# Patient Record
Sex: MALE | Race: WHITE | NOT HISPANIC OR LATINO | Employment: FULL TIME | URBAN - METROPOLITAN AREA
[De-identification: names, ages, dates, MRNs, and addresses within clinical notes are randomized per-mention and may not be internally consistent; named-entity substitution may affect disease eponyms.]

---

## 2020-04-21 DIAGNOSIS — Z01.84 ANTIBODY RESPONSE EXAMINATION: ICD-10-CM

## 2020-04-23 ENCOUNTER — LAB VISIT (OUTPATIENT)
Dept: LAB | Facility: HOSPITAL | Age: 30
End: 2020-04-23
Attending: INTERNAL MEDICINE
Payer: MEDICAID

## 2020-04-23 DIAGNOSIS — Z01.84 ANTIBODY RESPONSE EXAMINATION: ICD-10-CM

## 2020-04-23 LAB — SARS-COV-2 IGG SERPL QL IA: NEGATIVE

## 2020-04-23 PROCEDURE — 86769 SARS-COV-2 COVID-19 ANTIBODY: CPT

## 2020-04-23 PROCEDURE — 36415 COLL VENOUS BLD VENIPUNCTURE: CPT

## 2022-09-29 ENCOUNTER — OFFICE VISIT (OUTPATIENT)
Dept: PRIMARY CARE CLINIC | Facility: CLINIC | Age: 32
End: 2022-09-29
Payer: MEDICAID

## 2022-09-29 VITALS
HEIGHT: 72 IN | RESPIRATION RATE: 18 BRPM | TEMPERATURE: 98 F | SYSTOLIC BLOOD PRESSURE: 120 MMHG | HEART RATE: 64 BPM | WEIGHT: 215.75 LBS | OXYGEN SATURATION: 97 % | DIASTOLIC BLOOD PRESSURE: 64 MMHG | BODY MASS INDEX: 29.22 KG/M2

## 2022-09-29 DIAGNOSIS — M25.561 CHRONIC PAIN OF RIGHT KNEE: ICD-10-CM

## 2022-09-29 DIAGNOSIS — Z11.59 NEED FOR HEPATITIS C SCREENING TEST: ICD-10-CM

## 2022-09-29 DIAGNOSIS — J45.990 EXERCISE-INDUCED ASTHMA: Primary | ICD-10-CM

## 2022-09-29 DIAGNOSIS — R10.11 RUQ PAIN: ICD-10-CM

## 2022-09-29 DIAGNOSIS — Z11.4 ENCOUNTER FOR SCREENING FOR HUMAN IMMUNODEFICIENCY VIRUS (HIV): ICD-10-CM

## 2022-09-29 DIAGNOSIS — Z00.00 WELL ADULT EXAM: ICD-10-CM

## 2022-09-29 DIAGNOSIS — N62 PSEUDOGYNECOMASTIA: ICD-10-CM

## 2022-09-29 DIAGNOSIS — G89.29 CHRONIC PAIN OF RIGHT KNEE: ICD-10-CM

## 2022-09-29 DIAGNOSIS — R07.89 SENSATION OF CHEST TIGHTNESS: ICD-10-CM

## 2022-09-29 DIAGNOSIS — B36.0 TINEA VERSICOLOR: ICD-10-CM

## 2022-09-29 DIAGNOSIS — K21.9 GASTROESOPHAGEAL REFLUX DISEASE, UNSPECIFIED WHETHER ESOPHAGITIS PRESENT: ICD-10-CM

## 2022-09-29 PROCEDURE — 3074F PR MOST RECENT SYSTOLIC BLOOD PRESSURE < 130 MM HG: ICD-10-PCS | Mod: CPTII,,, | Performed by: FAMILY MEDICINE

## 2022-09-29 PROCEDURE — 99999 PR PBB SHADOW E&M-EST. PATIENT-LVL V: ICD-10-PCS | Mod: PBBFAC,,, | Performed by: FAMILY MEDICINE

## 2022-09-29 PROCEDURE — 3008F BODY MASS INDEX DOCD: CPT | Mod: CPTII,,, | Performed by: FAMILY MEDICINE

## 2022-09-29 PROCEDURE — 3008F PR BODY MASS INDEX (BMI) DOCUMENTED: ICD-10-PCS | Mod: CPTII,,, | Performed by: FAMILY MEDICINE

## 2022-09-29 PROCEDURE — 3078F DIAST BP <80 MM HG: CPT | Mod: CPTII,,, | Performed by: FAMILY MEDICINE

## 2022-09-29 PROCEDURE — 93010 ELECTROCARDIOGRAM REPORT: CPT | Mod: S$PBB,,, | Performed by: INTERNAL MEDICINE

## 2022-09-29 PROCEDURE — 93010 EKG 12-LEAD: ICD-10-PCS | Mod: S$PBB,,, | Performed by: INTERNAL MEDICINE

## 2022-09-29 PROCEDURE — 99204 OFFICE O/P NEW MOD 45 MIN: CPT | Mod: S$PBB,,, | Performed by: FAMILY MEDICINE

## 2022-09-29 PROCEDURE — 1159F MED LIST DOCD IN RCRD: CPT | Mod: CPTII,,, | Performed by: FAMILY MEDICINE

## 2022-09-29 PROCEDURE — 3074F SYST BP LT 130 MM HG: CPT | Mod: CPTII,,, | Performed by: FAMILY MEDICINE

## 2022-09-29 PROCEDURE — 3078F PR MOST RECENT DIASTOLIC BLOOD PRESSURE < 80 MM HG: ICD-10-PCS | Mod: CPTII,,, | Performed by: FAMILY MEDICINE

## 2022-09-29 PROCEDURE — 99215 OFFICE O/P EST HI 40 MIN: CPT | Mod: PBBFAC,PN | Performed by: FAMILY MEDICINE

## 2022-09-29 PROCEDURE — 1160F PR REVIEW ALL MEDS BY PRESCRIBER/CLIN PHARMACIST DOCUMENTED: ICD-10-PCS | Mod: CPTII,,, | Performed by: FAMILY MEDICINE

## 2022-09-29 PROCEDURE — 99999 PR PBB SHADOW E&M-EST. PATIENT-LVL V: CPT | Mod: PBBFAC,,, | Performed by: FAMILY MEDICINE

## 2022-09-29 PROCEDURE — 93005 ELECTROCARDIOGRAM TRACING: CPT | Mod: PBBFAC,PN | Performed by: INTERNAL MEDICINE

## 2022-09-29 PROCEDURE — 1159F PR MEDICATION LIST DOCUMENTED IN MEDICAL RECORD: ICD-10-PCS | Mod: CPTII,,, | Performed by: FAMILY MEDICINE

## 2022-09-29 PROCEDURE — 1160F RVW MEDS BY RX/DR IN RCRD: CPT | Mod: CPTII,,, | Performed by: FAMILY MEDICINE

## 2022-09-29 PROCEDURE — 99204 PR OFFICE/OUTPT VISIT, NEW, LEVL IV, 45-59 MIN: ICD-10-PCS | Mod: S$PBB,,, | Performed by: FAMILY MEDICINE

## 2022-09-29 RX ORDER — KETOCONAZOLE 20 MG/ML
SHAMPOO, SUSPENSION TOPICAL
Qty: 120 ML | Refills: 2 | Status: SHIPPED | OUTPATIENT
Start: 2022-09-29

## 2022-09-29 RX ORDER — ALBUTEROL SULFATE 90 UG/1
2 AEROSOL, METERED RESPIRATORY (INHALATION) EVERY 6 HOURS PRN
Qty: 18 G | Refills: 0 | Status: SHIPPED | OUTPATIENT
Start: 2022-09-29 | End: 2023-08-03 | Stop reason: SDUPTHER

## 2022-10-03 NOTE — PROGRESS NOTES
Subjective:       Patient ID: Ze Toscano is a 32 y.o. male.    Chief Complaint: Annual Exam    HPI:  Patient is a 32 year old male presents with multiple complaints.  Patient with exercise induced asthma and requesting a refill on Albuterol inhaler.  He has tightness in the substernal region lasting < 1 second.  Patient also with chronic fungal infection involving his trunk.  He also has concerns about possible ADD.  He reports forgetfulness and having to pace around often.  He reports enlarged breast tissue for years despite exercise> He also reports chronic GERD for years.  He is concerned about sharp RUQ pain over past 2-3 weeks.  Pain occurs for .5 to 1.5 hrs after meals.  Review of Systems   Constitutional:  Negative for appetite change, chills, diaphoresis, fatigue, fever and unexpected weight change.   HENT:  Negative for ear pain, sinus pressure/congestion, sore throat and trouble swallowing.    Eyes:  Negative for visual disturbance.   Respiratory:  Negative for cough, chest tightness, shortness of breath and wheezing.    Cardiovascular:  Positive for chest pain. Negative for palpitations and leg swelling.   Gastrointestinal:  Positive for abdominal pain. Negative for abdominal distention, constipation, diarrhea, nausea and vomiting.   Genitourinary:  Negative for difficulty urinating, discharge, dysuria, frequency, hematuria and urgency.   Musculoskeletal:  Negative for arthralgias, back pain and joint swelling.   Integumentary:  Negative for rash.   Neurological:  Negative for dizziness, light-headedness and headaches.   Hematological:  Negative for adenopathy.   Psychiatric/Behavioral:  Positive for decreased concentration. Negative for dysphoric mood and sleep disturbance. The patient is hyperactive. The patient is not nervous/anxious.        Objective:      Physical Exam  Constitutional:       Appearance: Normal appearance. He is well-developed.   HENT:      Head: Normocephalic and atraumatic.       Right Ear: External ear normal.      Left Ear: External ear normal.      Nose: Nose normal.      Mouth/Throat:      Pharynx: No oropharyngeal exudate.   Eyes:      Conjunctiva/sclera: Conjunctivae normal.      Pupils: Pupils are equal, round, and reactive to light.   Neck:      Thyroid: No thyromegaly.   Cardiovascular:      Rate and Rhythm: Normal rate and regular rhythm.      Heart sounds: Normal heart sounds. No murmur heard.  Pulmonary:      Effort: Pulmonary effort is normal.      Breath sounds: Normal breath sounds. No wheezing or rales.   Chest:      Comments: Enlarged breast tissue, no palpable nodules  Abdominal:      General: Bowel sounds are normal.      Palpations: Abdomen is soft. There is no mass.      Tenderness: There is no abdominal tenderness.   Musculoskeletal:      Cervical back: Neck supple.      Right lower leg: No edema.      Left lower leg: No edema.   Lymphadenopathy:      Cervical: No cervical adenopathy.      Upper Body:      Right upper body: No supraclavicular adenopathy.      Left upper body: No supraclavicular adenopathy.   Skin:     General: Skin is warm and dry.      Findings: Rash (diffuse hyperpigmented rash on trunk) present.   Neurological:      Mental Status: He is alert and oriented to person, place, and time.   Psychiatric:         Mood and Affect: Mood normal.     EKG:  NSR VR 61  Assessment:       Problem List Items Addressed This Visit    None  Visit Diagnoses       Exercise-induced asthma    -  Primary    Relevant Medications    albuterol (PROVENTIL HFA) 90 mcg/actuation inhaler    Sensation of chest tightness        Relevant Orders    IN OFFICE EKG 12-LEAD (to Muse)    Tinea versicolor        Relevant Medications    ketoconazole (NIZORAL) 2 % shampoo    Gastroesophageal reflux disease, unspecified whether esophagitis present        Pseudogynecomastia        Relevant Orders    Testosterone, Free    RUQ pain        Relevant Orders    Ambulatory referral/consult to  Gastroenterology    Well adult exam        Relevant Orders    Lipid Panel    Need for hepatitis C screening test        Relevant Orders    Hepatitis C Antibody    Encounter for screening for human immunodeficiency virus (HIV)        Relevant Orders    HIV 1/2 Ag/Ab (4th Gen)    Chronic pain of right knee        Relevant Orders    Ambulatory referral/consult to Sports Medicine              Plan:     Ze was seen today for annual exam.    Diagnoses and all orders for this visit:    Exercise-induced asthma  -     albuterol (PROVENTIL HFA) 90 mcg/actuation inhaler; Inhale 2 puffs into the lungs every 6 (six) hours as needed for Wheezing. Rescue    Sensation of chest tightness  -     IN OFFICE EKG 12-LEAD (to Muse)    Tinea versicolor  -     ketoconazole (NIZORAL) 2 % shampoo; Apply topically twice a week.    Gastroesophageal reflux disease, unspecified whether esophagitis present  Clinically stable    Pseudogynecomastia  -     Testosterone, Free; Future    RUQ pain  -     Ambulatory referral/consult to Gastroenterology; Future    Well adult exam  -     Lipid Panel; Future    Need for hepatitis C screening test  -     Hepatitis C Antibody; Future    Encounter for screening for human immunodeficiency virus (HIV)  -     HIV 1/2 Ag/Ab (4th Gen); Future    Chronic pain of right knee  -     Ambulatory referral/consult to Sports Medicine; Future

## 2022-11-28 DIAGNOSIS — M25.561 RIGHT KNEE PAIN, UNSPECIFIED CHRONICITY: Primary | ICD-10-CM

## 2022-11-29 ENCOUNTER — HOSPITAL ENCOUNTER (OUTPATIENT)
Dept: RADIOLOGY | Facility: HOSPITAL | Age: 32
Discharge: HOME | End: 2022-11-29
Attending: ORTHOPAEDIC SURGERY
Payer: MEDICAID

## 2022-11-29 ENCOUNTER — OFFICE VISIT (OUTPATIENT)
Dept: ORTHOPEDICS | Facility: CLINIC | Age: 32
End: 2022-11-29
Payer: MEDICAID

## 2022-11-29 VITALS
BODY MASS INDEX: 30.58 KG/M2 | SYSTOLIC BLOOD PRESSURE: 144 MMHG | HEIGHT: 72 IN | DIASTOLIC BLOOD PRESSURE: 78 MMHG | WEIGHT: 225.75 LBS | HEART RATE: 77 BPM

## 2022-11-29 DIAGNOSIS — M25.561 RIGHT KNEE PAIN, UNSPECIFIED CHRONICITY: ICD-10-CM

## 2022-11-29 DIAGNOSIS — G89.29 CHRONIC PAIN OF RIGHT KNEE: ICD-10-CM

## 2022-11-29 DIAGNOSIS — M25.561 CHRONIC PAIN OF RIGHT KNEE: ICD-10-CM

## 2022-11-29 PROCEDURE — 3008F PR BODY MASS INDEX (BMI) DOCUMENTED: ICD-10-PCS | Mod: CPTII,,, | Performed by: ORTHOPAEDIC SURGERY

## 2022-11-29 PROCEDURE — 99999 PR PBB SHADOW E&M-EST. PATIENT-LVL III: CPT | Mod: PBBFAC,,, | Performed by: ORTHOPAEDIC SURGERY

## 2022-11-29 PROCEDURE — 73564 X-RAY EXAM KNEE 4 OR MORE: CPT | Mod: 26,RT,, | Performed by: RADIOLOGY

## 2022-11-29 PROCEDURE — 99204 PR OFFICE/OUTPT VISIT, NEW, LEVL IV, 45-59 MIN: ICD-10-PCS | Mod: S$PBB,,, | Performed by: ORTHOPAEDIC SURGERY

## 2022-11-29 PROCEDURE — 99213 OFFICE O/P EST LOW 20 MIN: CPT | Mod: PBBFAC,PN | Performed by: ORTHOPAEDIC SURGERY

## 2022-11-29 PROCEDURE — 99999 PR PBB SHADOW E&M-EST. PATIENT-LVL III: ICD-10-PCS | Mod: PBBFAC,,, | Performed by: ORTHOPAEDIC SURGERY

## 2022-11-29 PROCEDURE — 3078F PR MOST RECENT DIASTOLIC BLOOD PRESSURE < 80 MM HG: ICD-10-PCS | Mod: CPTII,,, | Performed by: ORTHOPAEDIC SURGERY

## 2022-11-29 PROCEDURE — 1159F MED LIST DOCD IN RCRD: CPT | Mod: CPTII,,, | Performed by: ORTHOPAEDIC SURGERY

## 2022-11-29 PROCEDURE — 73564 X-RAY EXAM KNEE 4 OR MORE: CPT | Mod: TC,FY,RT

## 2022-11-29 PROCEDURE — 3078F DIAST BP <80 MM HG: CPT | Mod: CPTII,,, | Performed by: ORTHOPAEDIC SURGERY

## 2022-11-29 PROCEDURE — 3008F BODY MASS INDEX DOCD: CPT | Mod: CPTII,,, | Performed by: ORTHOPAEDIC SURGERY

## 2022-11-29 PROCEDURE — 1159F PR MEDICATION LIST DOCUMENTED IN MEDICAL RECORD: ICD-10-PCS | Mod: CPTII,,, | Performed by: ORTHOPAEDIC SURGERY

## 2022-11-29 PROCEDURE — 73564 XR KNEE COMP 4 OR MORE VIEWS RIGHT: ICD-10-PCS | Mod: 26,RT,, | Performed by: RADIOLOGY

## 2022-11-29 PROCEDURE — 3077F SYST BP >= 140 MM HG: CPT | Mod: CPTII,,, | Performed by: ORTHOPAEDIC SURGERY

## 2022-11-29 PROCEDURE — 3077F PR MOST RECENT SYSTOLIC BLOOD PRESSURE >= 140 MM HG: ICD-10-PCS | Mod: CPTII,,, | Performed by: ORTHOPAEDIC SURGERY

## 2022-11-29 PROCEDURE — 99204 OFFICE O/P NEW MOD 45 MIN: CPT | Mod: S$PBB,,, | Performed by: ORTHOPAEDIC SURGERY

## 2022-11-29 NOTE — PROGRESS NOTES
Subjective:      Patient ID: Ze Toscano is a 32 y.o. male.    Chief Complaint: Pain of the Right Knee    HPI    31yo M with history of right knee 'grinding injury' which he states occurred in April of 2022. He has had periodic swelling and pain, which is relieved with ibuprofen. He denies mechanical symptoms, catching, locking, popping. He denies instability or buckling, he denies paresthesias or weakness. He has not tried knee sleeves, bracing, or therapy. He denies previous injuries to this knee.     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     Social History     Occupational History    Not on file   Tobacco Use    Smoking status: Never    Smokeless tobacco: Never   Substance and Sexual Activity    Alcohol use: Never     Comment: social drinker    Drug use: Never    Sexual activity: Yes     Partners: Female      ROS      Negative except per above    Objective:    Ortho/SPM Exam       MSK  RLE  No obvious deformities, skin benign, no swelling  No TTP over the patellofemoral compartment, patella grind negative  No joint line tenderness  ROM 0-125  [facility]'s negative  Apley's negative  Knee stable to varus/valgus stress at 0/30  Negative ant/post drawer, lachman's stable  Sensation intact Sa/Moss/Sp/Dp/t  Skin wwp, brisk cr      Assessment:       1. Chronic pain of right knee          Plan:       -suspect partial subluxation of right patella, instructed on knee sleeve for stability, quad and hamstring strengthening  -okay to continue ibuprofen for occasional pain, instructed to return for reevaluation for new or worsening mechanical symptoms

## 2023-07-14 DIAGNOSIS — J45.990 EXERCISE-INDUCED ASTHMA: ICD-10-CM

## 2023-07-14 RX ORDER — ALBUTEROL SULFATE 90 UG/1
2 AEROSOL, METERED RESPIRATORY (INHALATION) EVERY 6 HOURS PRN
Qty: 18 G | Refills: 0 | OUTPATIENT
Start: 2023-07-14 | End: 2024-07-13

## 2023-07-27 ENCOUNTER — TELEPHONE (OUTPATIENT)
Dept: GASTROENTEROLOGY | Facility: CLINIC | Age: 33
End: 2023-07-27
Payer: COMMERCIAL

## 2023-07-27 DIAGNOSIS — R10.9 ABDOMINAL PAIN, UNSPECIFIED ABDOMINAL LOCATION: Primary | ICD-10-CM

## 2023-07-27 NOTE — TELEPHONE ENCOUNTER
----- Message from Dennis Su MA sent at 7/27/2023  2:10 PM CDT -----    ----- Message -----  From: Minda Pardo  Sent: 7/27/2023  10:47 AM CDT  To: Karma Carroll Staff    Type:  Needs Medical Advice    Who Called:  Pt    Would the patient rather a call back or a response via [facility]?  call  Best Call Back Number:  [phone]  Additional Information:  Pt would like a call back regarding his appt that is scheduled for 8/24/23.  Pt is an [employer] employee and states that he does not see that appt on his end.

## 2023-07-27 NOTE — TELEPHONE ENCOUNTER
Spoke with pt scheduled procedure on 08/29/2023. Sent instructions through the portal verbal understanding

## 2023-08-03 ENCOUNTER — TELEPHONE (OUTPATIENT)
Dept: PRIMARY CARE CLINIC | Facility: CLINIC | Age: 33
End: 2023-08-03
Payer: COMMERCIAL

## 2023-08-03 ENCOUNTER — OFFICE VISIT (OUTPATIENT)
Dept: PRIMARY CARE CLINIC | Facility: CLINIC | Age: 33
End: 2023-08-03
Payer: COMMERCIAL

## 2023-08-03 DIAGNOSIS — Z76.0 ENCOUNTER FOR MEDICATION REFILL: Primary | ICD-10-CM

## 2023-08-03 DIAGNOSIS — Z76.89 ENCOUNTER TO ESTABLISH CARE WITH NEW DOCTOR: ICD-10-CM

## 2023-08-03 DIAGNOSIS — J45.990 EXERCISE-INDUCED ASTHMA: ICD-10-CM

## 2023-08-03 PROCEDURE — 1160F RVW MEDS BY RX/DR IN RCRD: CPT | Mod: CPTII,95,, | Performed by: NURSE PRACTITIONER

## 2023-08-03 PROCEDURE — 99213 PR OFFICE/OUTPT VISIT, EST, LEVL III, 20-29 MIN: ICD-10-PCS | Mod: 95,,, | Performed by: NURSE PRACTITIONER

## 2023-08-03 PROCEDURE — 1160F PR REVIEW ALL MEDS BY PRESCRIBER/CLIN PHARMACIST DOCUMENTED: ICD-10-PCS | Mod: CPTII,95,, | Performed by: NURSE PRACTITIONER

## 2023-08-03 PROCEDURE — 99213 OFFICE O/P EST LOW 20 MIN: CPT | Mod: 95,,, | Performed by: NURSE PRACTITIONER

## 2023-08-03 PROCEDURE — 1159F MED LIST DOCD IN RCRD: CPT | Mod: CPTII,95,, | Performed by: NURSE PRACTITIONER

## 2023-08-03 PROCEDURE — 1159F PR MEDICATION LIST DOCUMENTED IN MEDICAL RECORD: ICD-10-PCS | Mod: CPTII,95,, | Performed by: NURSE PRACTITIONER

## 2023-08-03 RX ORDER — ALBUTEROL SULFATE 90 UG/1
2 AEROSOL, METERED RESPIRATORY (INHALATION) EVERY 6 HOURS PRN
Qty: 18 G | Refills: 0 | Status: SHIPPED | OUTPATIENT
Start: 2023-08-03 | End: 2023-09-01 | Stop reason: SDUPTHER

## 2023-08-03 NOTE — PROGRESS NOTES
The patient location is: [place]  The chief complaint leading to consultation is: med refill    Visit type: audiovisual    Face to Face time with patient: 10  20 minutes of total time spent on the encounter, which includes face to face time and non-face to face time preparing to see the patient (eg, review of tests), Obtaining and/or reviewing separately obtained history, Documenting clinical information in the electronic or other health record, Independently interpreting results (not separately reported) and communicating results to the patient/family/caregiver, or Care coordination (not separately reported).     Each patient to whom he or she provides medical services by telemedicine is:  (1) informed of the relationship between the physician and patient and the respective role of any other health care provider with respect to management of the patient; and (2) notified that he or she may decline to receive medical services by telemedicine and may withdraw from such care at any time.    Notes:   Subjective:       Patient ID: Ze Toscano is a 32 y.o. male.    Chief Complaint: med refill  Mr. Ze Toscano is a 32 year old male, new to me,  presents for telemedicine visit for med refill. No PCP. Medical and surgical history in addition to problem list reviewed as listed below.    Presents for medication refill, needs to establish care with PCP.      Past Medical History:   Diagnosis Date    Asthma         History reviewed. No pertinent surgical history.     Family History   Problem Relation Age of Onset    Diabetes Father     Mitral valve prolapse Father        Social History     Tobacco Use   Smoking Status Never   Smokeless Tobacco Never       Social History     Social History Narrative    Not on file       Review of patient's allergies indicates:   Allergen Reactions    Latex, natural rubber Hives        Review of Systems   Eyes:  Positive for photophobia.   Respiratory:  Negative for chest tightness  and shortness of breath.    Cardiovascular:  Negative for chest pain and palpitations.   Gastrointestinal:  Negative for nausea and vomiting.         Objective:        There were no vitals filed for this visit.     Limited physical examination due to nature of virtual/telemedicine visit.    Physical Exam  Constitutional:       Appearance: Normal appearance.   Pulmonary:      Effort: Pulmonary effort is normal. No respiratory distress.   Neurological:      Mental Status: He is alert and oriented to person, place, and time.         Assessment:       1. Encounter for medication refill    2. Exercise-induced asthma    3. Encounter to establish care with new doctor        Plan:       Encounter for medication refill    Exercise-induced asthma  -     albuterol (PROVENTIL HFA) 90 mcg/actuation inhaler; Inhale 2 puffs into the lungs every 6 (six) hours as needed for Wheezing. Rescue  Dispense: 18 g; Refill: 0    Encounter to establish care with new doctor  -     Ambulatory referral/consult to Internal Medicine; Future; Expected date: 08/03/2023       Medication List with Changes/Refills   Current Medications    KETOCONAZOLE (NIZORAL) 2 % SHAMPOO    Apply topically twice a week.   Changed and/or Refilled Medications    Modified Medication Previous Medication    ALBUTEROL (PROVENTIL HFA) 90 MCG/ACTUATION INHALER albuterol (PROVENTIL HFA) 90 mcg/actuation inhaler       Inhale 2 puffs into the lungs every 6 (six) hours as needed for Wheezing. Rescue    Inhale 2 puffs into the lungs every 6 (six) hours as needed for Wheezing. Rescue            Follow up if symptoms worsen or fail to improve.    Luna Greenberg, APRN, MSN, FNP-C

## 2023-08-25 ENCOUNTER — TELEPHONE (OUTPATIENT)
Dept: ENDOSCOPY | Facility: HOSPITAL | Age: 33
End: 2023-08-25
Payer: COMMERCIAL

## 2023-08-25 NOTE — TELEPHONE ENCOUNTER
Spoke with patient about arrival time @ 0900    NPO status reviewed: Patient must have nothing to eat after midnight.  Pt may have CLEAR liquids ONLY until completely NPO 3 hrs @ 0600    Medications: Do not take Insulin or oral diabetic medications the day of the procedure.  Take as prescribed: heart, seizure and blood pressure medication in the morning with a sip of water (less than an ounce).  Take any breathing medications and bring inhalers to hospital with you Leave all valuables and jewelry at home.     Wear comfortable clothes to procedure to change into hospital gown You cannot drive for 24 hours after your procedure because you will receive sedation for your procedure to make you comfortable.  A ride must be provided at discharge.

## 2023-08-29 ENCOUNTER — ANESTHESIA EVENT (OUTPATIENT)
Dept: ENDOSCOPY | Facility: HOSPITAL | Age: 33
End: 2023-08-29
Payer: COMMERCIAL

## 2023-08-29 ENCOUNTER — ANESTHESIA (OUTPATIENT)
Dept: ENDOSCOPY | Facility: HOSPITAL | Age: 33
End: 2023-08-29
Payer: COMMERCIAL

## 2023-08-29 ENCOUNTER — HOSPITAL ENCOUNTER (OUTPATIENT)
Facility: HOSPITAL | Age: 33
Discharge: HOME | End: 2023-08-29
Attending: INTERNAL MEDICINE | Admitting: INTERNAL MEDICINE
Payer: COMMERCIAL

## 2023-08-29 VITALS
HEIGHT: 72 IN | BODY MASS INDEX: 30.88 KG/M2 | HEART RATE: 59 BPM | OXYGEN SATURATION: 98 % | TEMPERATURE: 99 F | SYSTOLIC BLOOD PRESSURE: 118 MMHG | RESPIRATION RATE: 13 BRPM | WEIGHT: 228 LBS | DIASTOLIC BLOOD PRESSURE: 65 MMHG

## 2023-08-29 DIAGNOSIS — K21.9 GERD (GASTROESOPHAGEAL REFLUX DISEASE): ICD-10-CM

## 2023-08-29 PROCEDURE — D9220A PRA ANESTHESIA: Mod: CRNA,,, | Performed by: NURSE ANESTHETIST, CERTIFIED REGISTERED

## 2023-08-29 PROCEDURE — 27201089 HC SNARE, DISP (ANY): Performed by: INTERNAL MEDICINE

## 2023-08-29 PROCEDURE — 37000008 HC ANESTHESIA 1ST 15 MINUTES: Performed by: INTERNAL MEDICINE

## 2023-08-29 PROCEDURE — 63600175 PHARM REV CODE 636 W HCPCS: Performed by: NURSE ANESTHETIST, CERTIFIED REGISTERED

## 2023-08-29 PROCEDURE — 88305 TISSUE EXAM BY PATHOLOGIST: CPT | Performed by: PATHOLOGY

## 2023-08-29 PROCEDURE — 88305 TISSUE EXAM BY PATHOLOGIST: CPT | Mod: 26,,, | Performed by: PATHOLOGY

## 2023-08-29 PROCEDURE — 37000009 HC ANESTHESIA EA ADD 15 MINS: Performed by: INTERNAL MEDICINE

## 2023-08-29 PROCEDURE — 43239 EGD BIOPSY SINGLE/MULTIPLE: CPT | Performed by: INTERNAL MEDICINE

## 2023-08-29 PROCEDURE — D9220A PRA ANESTHESIA: ICD-10-PCS | Mod: ANES,,, | Performed by: ANESTHESIOLOGY

## 2023-08-29 PROCEDURE — 43239 EGD BIOPSY SINGLE/MULTIPLE: CPT | Mod: ,,, | Performed by: INTERNAL MEDICINE

## 2023-08-29 PROCEDURE — 88305 TISSUE EXAM BY PATHOLOGIST: ICD-10-PCS | Mod: 26,,, | Performed by: PATHOLOGY

## 2023-08-29 PROCEDURE — D9220A PRA ANESTHESIA: Mod: ANES,,, | Performed by: ANESTHESIOLOGY

## 2023-08-29 PROCEDURE — D9220A PRA ANESTHESIA: ICD-10-PCS | Mod: CRNA,,, | Performed by: NURSE ANESTHETIST, CERTIFIED REGISTERED

## 2023-08-29 PROCEDURE — 43239 PR EGD, FLEX, W/BIOPSY, SGL/MULTI: ICD-10-PCS | Mod: ,,, | Performed by: INTERNAL MEDICINE

## 2023-08-29 PROCEDURE — 25000003 PHARM REV CODE 250: Performed by: NURSE ANESTHETIST, CERTIFIED REGISTERED

## 2023-08-29 RX ORDER — PROPOFOL 10 MG/ML
VIAL (ML) INTRAVENOUS
Status: DISCONTINUED | OUTPATIENT
Start: 2023-08-29 | End: 2023-08-29

## 2023-08-29 RX ORDER — LIDOCAINE HYDROCHLORIDE 20 MG/ML
INJECTION INTRAVENOUS
Status: DISCONTINUED | OUTPATIENT
Start: 2023-08-29 | End: 2023-08-29

## 2023-08-29 RX ORDER — SODIUM CHLORIDE 9 MG/ML
INJECTION, SOLUTION INTRAVENOUS CONTINUOUS
Status: DISCONTINUED | OUTPATIENT
Start: 2023-08-29 | End: 2023-08-29 | Stop reason: HOSPADM

## 2023-08-29 RX ORDER — DEXMEDETOMIDINE HYDROCHLORIDE 100 UG/ML
INJECTION, SOLUTION INTRAVENOUS
Status: DISCONTINUED | OUTPATIENT
Start: 2023-08-29 | End: 2023-08-29

## 2023-08-29 RX ORDER — SODIUM CHLORIDE, SODIUM LACTATE, POTASSIUM CHLORIDE, CALCIUM CHLORIDE 600; 310; 30; 20 MG/100ML; MG/100ML; MG/100ML; MG/100ML
INJECTION, SOLUTION INTRAVENOUS CONTINUOUS PRN
Status: DISCONTINUED | OUTPATIENT
Start: 2023-08-29 | End: 2023-08-29

## 2023-08-29 RX ORDER — PROPOFOL 10 MG/ML
VIAL (ML) INTRAVENOUS CONTINUOUS PRN
Status: DISCONTINUED | OUTPATIENT
Start: 2023-08-29 | End: 2023-08-29

## 2023-08-29 RX ADMIN — GLYCOPYRROLATE 0.2 MG: 0.2 INJECTION, SOLUTION INTRAMUSCULAR; INTRAVITREAL at 10:08

## 2023-08-29 RX ADMIN — PROPOFOL 175 MCG/KG/MIN: 10 INJECTION, EMULSION INTRAVENOUS at 10:08

## 2023-08-29 RX ADMIN — SODIUM CHLORIDE, SODIUM LACTATE, POTASSIUM CHLORIDE, AND CALCIUM CHLORIDE: .6; .31; .03; .02 INJECTION, SOLUTION INTRAVENOUS at 09:08

## 2023-08-29 RX ADMIN — LIDOCAINE HYDROCHLORIDE 75 MG: 20 INJECTION, SOLUTION INTRAVENOUS at 10:08

## 2023-08-29 RX ADMIN — TOPICAL ANESTHETIC 1 EACH: 200 SPRAY DENTAL; PERIODONTAL at 10:08

## 2023-08-29 RX ADMIN — PROPOFOL 70 MG: 10 INJECTION, EMULSION INTRAVENOUS at 10:08

## 2023-08-29 RX ADMIN — DEXMEDETOMIDINE HCL 12 MCG: 100 INJECTION INTRAVENOUS at 10:08

## 2023-08-29 RX ADMIN — PROPOFOL 100 MG: 10 INJECTION, EMULSION INTRAVENOUS at 10:08

## 2023-08-29 NOTE — ANESTHESIA POSTPROCEDURE EVALUATION
Anesthesia Post Evaluation    Patient: Ze Toscano    Procedure(s) Performed: Procedure(s) (LRB):  EGD (ESOPHAGOGASTRODUODENOSCOPY) (N/A)    Final Anesthesia Type: general      Patient location during evaluation: GI PACU  Patient participation: Yes- Able to Participate  Level of consciousness: awake and alert  Post-procedure vital signs: reviewed and stable  Pain management: adequate  Airway patency: patent    PONV status at discharge: No PONV  Anesthetic complications: no      Cardiovascular status: blood pressure returned to baseline and hemodynamically stable  Respiratory status: unassisted  Hydration status: euvolemic  Follow-up not needed.          Vitals Value Taken Time   /65 08/29/23 1055     08/29/23 1140   Pulse 59 08/29/23 1055   Resp 13 08/29/23 1055   SpO2 98 % 08/29/23 1055         Event Time   Out of Recovery 11:11:51         Pain/Luz Score: Luz Score: 10 (8/29/2023 10:40 AM)

## 2023-08-29 NOTE — H&P
"Short Stay Endoscopy History and Physical    PCP - Mildred Alfaro MD    Procedure - EGD  ASA - II  Mallampati - per anesthesia  History of Anesthesia problems - no  Family history Anesthesia problems - no     HPI:  This is a 33 y.o. male here for evaluation of : GERD      ROS:  Constitutional: No fevers, chills, No weight loss  ENT: No allergies  CV: No chest pain  Pulm: No shortness of breath  GI: see HPI  Derm: No rash    Medical History:  has a past medical history of Asthma.    Surgical History:  has no past surgical history on file.    Family History: family history includes Diabetes in his father; Mitral valve prolapse in his father.. Otherwise no colon cancer, inflammatory bowel disease, or GI malignancies.    Social History:  reports that he has never smoked. He has never used smokeless tobacco. He reports that he does not drink alcohol and does not use drugs.    Review of patient's allergies indicates:   Allergen Reactions    Latex, natural rubber Hives       Medications:   Medications Prior to Admission   Medication Sig Dispense Refill Last Dose    albuterol (PROVENTIL HFA) 90 mcg/actuation inhaler Inhale 2 puffs into the lungs every 6 (six) hours as needed for Wheezing. Rescue 18 g 0     ketoconazole (NIZORAL) 2 % shampoo Apply topically twice a week. 120 mL 2          Objective Findings:    Vital Signs: see nursing notes  Physical Exam:  General Appearance: Well appearing in no acute distress  Eyes:    No scleral icterus  ENT: Neck supple  Lungs: CTA anteriorly  Heart:  S1, S2 normal, no murmurs heard  Abdomen: Soft, non tender, non distended with positive bowel sounds. No hepatosplenomegaly, ascites, or mass  Extremities: no edema  Skin: No rash      Labs:  No results found for: "WBC", "HGB", "HCT", "PLT", "CHOL", "TRIG", "HDL", "LDLDIRECT", "ALT", "AST", "NA", "K", "CL", "CREATININE", "BUN", "CO2", "TSH", "PSA", "INR", "GLUF", "HGBA1C", "MICROALBUR"    I have explained the risks and benefits of endoscopy " procedures to the patient including but not limited to bleeding, perforation, infection, and death.    Glen Parada MD

## 2023-08-29 NOTE — ANESTHESIA PREPROCEDURE EVALUATION
08/29/2023  Ze Toscano is a 33 y.o., male.      Pre-op Assessment     I have reviewed the Nursing Notes.    I have reviewed the Medications.     Review of Systems  Anesthesia Hx:  No problems with previous Anesthesia  Denies Family Hx of Anesthesia complications.    Social:  Non-Smoker, No Alcohol Use    Hematology/Oncology:  Hematology Normal   Oncology Normal     EENT/Dental:EENT/Dental Normal   Cardiovascular:  Cardiovascular Normal Exercise tolerance: good     Pulmonary:  Pulmonary Normal    Renal/:  Renal/ Normal     Hepatic/GI:  Hepatic/GI Normal    Musculoskeletal:  Musculoskeletal Normal    Neurological:  Neurology Normal    Endocrine:  Endocrine Normal        Physical Exam  General: Well nourished    Airway:  Mallampati: II / II  Mouth Opening: Normal  TM Distance: Normal  Tongue: Normal  Neck ROM: Normal ROM    Dental:  Intact        Anesthesia Plan  Type of Anesthesia, risks & benefits discussed:    Anesthesia Type: Gen Natural Airway  Intra-op Monitoring Plan: Standard ASA Monitors  Post Op Pain Control Plan: multimodal analgesia  Induction:  IV  Airway Plan: Direct, Post-Induction  Informed Consent: Informed consent signed with the Patient and all parties understand the risks and agree with anesthesia plan.  All questions answered.   ASA Score: 2    Ready For Surgery From Anesthesia Perspective.     .

## 2023-08-29 NOTE — TRANSFER OF CARE
Anesthesia Transfer of Care Note    Patient: Ze Toscano    Procedure(s) Performed: Procedure(s) (LRB):  EGD (ESOPHAGOGASTRODUODENOSCOPY) (N/A)    Patient location: GI    Anesthesia Type: general    Transport from OR: Transported from OR on room air with adequate spontaneous ventilation    Post pain: adequate analgesia    Post assessment: no apparent anesthetic complications and tolerated procedure well    Post vital signs: stable    Level of consciousness: awake    Nausea/Vomiting: no nausea/vomiting    Complications: none    Transfer of care protocol was followed      Last vitals:   Visit Vitals  /65 (BP Location: Left arm, Patient Position: Lying)   Pulse 63   Temp 37 °C (98.6 °F) (Skin)   Resp 16   Ht 6' (1.829 m)   Wt 103.4 kg (228 lb)   SpO2 98%   BMI 30.92 kg/m²

## 2023-08-29 NOTE — PROVATION PATIENT INSTRUCTIONS
Discharge Summary/Instructions after an Endoscopic Procedure  Patient Name: Ze Toscano  Patient MRN: [id number]  Patient YOB: 1990 Tuesday, August 29, 2023  Glen Parada MD  Dear patient,  As a result of recent federal legislation (The Federal Cures Act), you may   receive lab or pathology results from your procedure in your [facility]   account before your physician is able to contact you. Your physician or   their representative will relay the results to you with their   recommendations at their soonest availability.  Thank you,  Your health is very important to us during the Covid Crisis. Following your   procedure today, you will receive a daily text for 2 weeks asking about   signs or symptoms of Covid 19.  Please respond to this text when you   receive it so we can follow up and keep you as safe as possible.   RESTRICTIONS:  During your procedure today, you received medications for sedation.  These   medications may affect your judgment, balance and coordination.  Therefore,   for 24 hours, you have the following restrictions:   - DO NOT drive a car, operate machinery, make legal/financial decisions,   sign important papers or drink alcohol.    ACTIVITY:  Today: no heavy lifting, straining or running due to procedural   sedation/anesthesia.  The following day: return to full activity including work.  DIET:  Eat and drink normally unless instructed otherwise.     TREATMENT FOR COMMON SIDE EFFECTS:  - Mild abdominal pain, nausea, belching, bloating or excessive gas:  rest,   eat lightly and use a heating pad.  - Sore Throat: treat with throat lozenges and/or gargle with warm salt   water.  - Because air was used during the procedure, expelling large amounts of air   from your rectum or belching is normal.  - If a bowel prep was taken, you may not have a bowel movement for 1-3 days.    This is normal.  SYMPTOMS TO WATCH FOR AND REPORT TO YOUR PHYSICIAN:  1. Abdominal pain or bloating,  other than gas cramps.  2. Chest pain.  3. Back pain.  4. Signs of infection such as: chills or fever occurring within 24 hours   after the procedure.  5. Rectal bleeding, which would show as bright red, maroon, or black stools.   (A tablespoon of blood from the rectum is not serious, especially if   hemorrhoids are present.)  6. Vomiting.  7. Weakness or dizziness.  GO DIRECTLY TO THE NEAREST EMERGENCY ROOM IF YOU HAVE ANY OF THE FOLLOWING:      Difficulty breathing              Chills and/or fever over 101 F   Persistent vomiting and/or vomiting blood   Severe abdominal pain   Severe chest pain   Black, tarry stools   Bleeding- more than one tablespoon   Any other symptom or condition that you feel may need urgent attention  Your doctor recommends these additional instructions:  If any biopsies were taken, your doctors clinic will contact you in 1 to 2   weeks with any results.  - Discharge patient to home.   - Resume previous diet.   - Continue present medications.   - Await pathology results.   - Return to GI office PRN.  For questions, problems or results please call your physician - Glen Parada MD.  EMERGENCY PHONE NUMBER: [phone],  LAB RESULTS: [phone]  IF A COMPLICATION OR EMERGENCY SITUATION ARISES AND YOU ARE UNABLE TO REACH   YOUR PHYSICIAN - GO DIRECTLY TO THE EMERGENCY ROOM.  Glen Parada MD  8/29/2023 10:35:38 AM  This report has been verified and signed electronically.  Dear patient,  As a result of recent federal legislation (The Federal Cures Act), you may   receive lab or pathology results from your procedure in your [facility]   account before your physician is able to contact you. Your physician or   their representative will relay the results to you with their   recommendations at their soonest availability.  Thank you,  [employer]ATION

## 2023-08-31 LAB
FINAL PATHOLOGIC DIAGNOSIS: NORMAL
GROSS: NORMAL
Lab: NORMAL

## 2023-08-31 NOTE — PROGRESS NOTES
Pathologic findings for recent EGD reviewed.  No evidence of H pylori infection identified.  Some mild inflammation was noted.  Continue current medications.  Follow up as needed

## 2023-09-01 ENCOUNTER — OFFICE VISIT (OUTPATIENT)
Dept: FAMILY MEDICINE | Facility: CLINIC | Age: 33
End: 2023-09-01
Payer: COMMERCIAL

## 2023-09-01 ENCOUNTER — LAB VISIT (OUTPATIENT)
Dept: LAB | Facility: HOSPITAL | Age: 33
End: 2023-09-01
Attending: INTERNAL MEDICINE
Payer: COMMERCIAL

## 2023-09-01 VITALS
WEIGHT: 227.5 LBS | SYSTOLIC BLOOD PRESSURE: 128 MMHG | HEIGHT: 72 IN | HEART RATE: 70 BPM | OXYGEN SATURATION: 98 % | BODY MASS INDEX: 30.81 KG/M2 | DIASTOLIC BLOOD PRESSURE: 86 MMHG

## 2023-09-01 DIAGNOSIS — Z00.00 ANNUAL PHYSICAL EXAM: Primary | ICD-10-CM

## 2023-09-01 DIAGNOSIS — M25.561 CHRONIC PAIN OF RIGHT KNEE: ICD-10-CM

## 2023-09-01 DIAGNOSIS — R53.83 OTHER FATIGUE: ICD-10-CM

## 2023-09-01 DIAGNOSIS — J45.20 MILD INTERMITTENT ASTHMA WITHOUT COMPLICATION: ICD-10-CM

## 2023-09-01 DIAGNOSIS — G89.29 CHRONIC PAIN OF RIGHT KNEE: ICD-10-CM

## 2023-09-01 DIAGNOSIS — Z00.00 ANNUAL PHYSICAL EXAM: ICD-10-CM

## 2023-09-01 DIAGNOSIS — R10.11 RUQ PAIN: ICD-10-CM

## 2023-09-01 LAB
25(OH)D3+25(OH)D2 SERPL-MCNC: 86 NG/ML (ref 30–96)
ALBUMIN SERPL BCP-MCNC: 4.2 G/DL (ref 3.5–5.2)
ALP SERPL-CCNC: 64 U/L (ref 55–135)
ALT SERPL W/O P-5'-P-CCNC: 27 U/L (ref 10–44)
ANION GAP SERPL CALC-SCNC: 11 MMOL/L (ref 8–16)
AST SERPL-CCNC: 19 U/L (ref 10–40)
BASOPHILS # BLD AUTO: 0.03 K/UL (ref 0–0.2)
BASOPHILS NFR BLD: 0.6 % (ref 0–1.9)
BILIRUB SERPL-MCNC: 0.8 MG/DL (ref 0.1–1)
BUN SERPL-MCNC: 17 MG/DL (ref 6–20)
CALCIUM SERPL-MCNC: 9.3 MG/DL (ref 8.7–10.5)
CHLORIDE SERPL-SCNC: 103 MMOL/L (ref 95–110)
CHOLEST SERPL-MCNC: 192 MG/DL (ref 120–199)
CHOLEST/HDLC SERPL: 3.4 {RATIO} (ref 2–5)
CO2 SERPL-SCNC: 26 MMOL/L (ref 23–29)
COMPLEXED PSA SERPL-MCNC: 0.25 NG/ML (ref 0–4)
CREAT SERPL-MCNC: 1.2 MG/DL (ref 0.5–1.4)
DIFFERENTIAL METHOD: ABNORMAL
EOSINOPHIL # BLD AUTO: 0.1 K/UL (ref 0–0.5)
EOSINOPHIL NFR BLD: 3 % (ref 0–8)
ERYTHROCYTE [DISTWIDTH] IN BLOOD BY AUTOMATED COUNT: 11.8 % (ref 11.5–14.5)
EST. GFR  (NO RACE VARIABLE): >60 ML/MIN/1.73 M^2
ESTIMATED AVG GLUCOSE: 105 MG/DL (ref 68–131)
ESTRADIOL SERPL-MCNC: 23 PG/ML (ref 11–44)
GLUCOSE SERPL-MCNC: 110 MG/DL (ref 70–110)
HBA1C MFR BLD: 5.3 % (ref 4–5.6)
HCT VFR BLD AUTO: 43.4 % (ref 40–54)
HCYS SERPL-SCNC: 7.2 UMOL/L (ref 4–16.5)
HDLC SERPL-MCNC: 56 MG/DL (ref 40–75)
HDLC SERPL: 29.2 % (ref 20–50)
HGB BLD-MCNC: 14.5 G/DL (ref 14–18)
IMM GRANULOCYTES # BLD AUTO: 0.01 K/UL (ref 0–0.04)
IMM GRANULOCYTES NFR BLD AUTO: 0.2 % (ref 0–0.5)
LDLC SERPL CALC-MCNC: 118.2 MG/DL (ref 63–159)
LYMPHOCYTES # BLD AUTO: 1.9 K/UL (ref 1–4.8)
LYMPHOCYTES NFR BLD: 40.9 % (ref 18–48)
MCH RBC QN AUTO: 31.3 PG (ref 27–31)
MCHC RBC AUTO-ENTMCNC: 33.4 G/DL (ref 32–36)
MCV RBC AUTO: 94 FL (ref 82–98)
MONOCYTES # BLD AUTO: 0.3 K/UL (ref 0.3–1)
MONOCYTES NFR BLD: 5.8 % (ref 4–15)
NEUTROPHILS # BLD AUTO: 2.3 K/UL (ref 1.8–7.7)
NEUTROPHILS NFR BLD: 49.5 % (ref 38–73)
NONHDLC SERPL-MCNC: 136 MG/DL
NRBC BLD-RTO: 0 /100 WBC
PLATELET # BLD AUTO: 210 K/UL (ref 150–450)
PMV BLD AUTO: 10.4 FL (ref 9.2–12.9)
POTASSIUM SERPL-SCNC: 4.1 MMOL/L (ref 3.5–5.1)
PROT SERPL-MCNC: 7.5 G/DL (ref 6–8.4)
RBC # BLD AUTO: 4.64 M/UL (ref 4.6–6.2)
SODIUM SERPL-SCNC: 140 MMOL/L (ref 136–145)
T3FREE SERPL-MCNC: 2.7 PG/ML (ref 2.3–4.2)
T4 FREE SERPL-MCNC: 1.04 NG/DL (ref 0.71–1.51)
TRIGL SERPL-MCNC: 89 MG/DL (ref 30–150)
TSH SERPL DL<=0.005 MIU/L-ACNC: 1.31 UIU/ML (ref 0.4–4)
VIT B12 SERPL-MCNC: 684 PG/ML (ref 210–950)
WBC # BLD AUTO: 4.69 K/UL (ref 3.9–12.7)

## 2023-09-01 PROCEDURE — 1160F RVW MEDS BY RX/DR IN RCRD: CPT | Mod: CPTII,S$GLB,, | Performed by: INTERNAL MEDICINE

## 2023-09-01 PROCEDURE — 84481 FREE ASSAY (FT-3): CPT | Performed by: INTERNAL MEDICINE

## 2023-09-01 PROCEDURE — 3079F DIAST BP 80-89 MM HG: CPT | Mod: CPTII,S$GLB,, | Performed by: INTERNAL MEDICINE

## 2023-09-01 PROCEDURE — 82306 VITAMIN D 25 HYDROXY: CPT | Performed by: INTERNAL MEDICINE

## 2023-09-01 PROCEDURE — 3074F SYST BP LT 130 MM HG: CPT | Mod: CPTII,S$GLB,, | Performed by: INTERNAL MEDICINE

## 2023-09-01 PROCEDURE — 84439 ASSAY OF FREE THYROXINE: CPT | Performed by: INTERNAL MEDICINE

## 2023-09-01 PROCEDURE — 36415 COLL VENOUS BLD VENIPUNCTURE: CPT | Performed by: INTERNAL MEDICINE

## 2023-09-01 PROCEDURE — 3008F BODY MASS INDEX DOCD: CPT | Mod: CPTII,S$GLB,, | Performed by: INTERNAL MEDICINE

## 2023-09-01 PROCEDURE — 83036 HEMOGLOBIN GLYCOSYLATED A1C: CPT | Performed by: INTERNAL MEDICINE

## 2023-09-01 PROCEDURE — 84443 ASSAY THYROID STIM HORMONE: CPT | Performed by: INTERNAL MEDICINE

## 2023-09-01 PROCEDURE — 3044F PR MOST RECENT HEMOGLOBIN A1C LEVEL <7.0%: ICD-10-PCS | Mod: CPTII,S$GLB,, | Performed by: INTERNAL MEDICINE

## 2023-09-01 PROCEDURE — 3074F PR MOST RECENT SYSTOLIC BLOOD PRESSURE < 130 MM HG: ICD-10-PCS | Mod: CPTII,S$GLB,, | Performed by: INTERNAL MEDICINE

## 2023-09-01 PROCEDURE — 80061 LIPID PANEL: CPT | Performed by: INTERNAL MEDICINE

## 2023-09-01 PROCEDURE — 83090 ASSAY OF HOMOCYSTEINE: CPT | Performed by: INTERNAL MEDICINE

## 2023-09-01 PROCEDURE — 99395 PREV VISIT EST AGE 18-39: CPT | Mod: S$GLB,,, | Performed by: INTERNAL MEDICINE

## 2023-09-01 PROCEDURE — 82670 ASSAY OF TOTAL ESTRADIOL: CPT | Performed by: INTERNAL MEDICINE

## 2023-09-01 PROCEDURE — 1159F MED LIST DOCD IN RCRD: CPT | Mod: CPTII,S$GLB,, | Performed by: INTERNAL MEDICINE

## 2023-09-01 PROCEDURE — 84153 ASSAY OF PSA TOTAL: CPT | Performed by: INTERNAL MEDICINE

## 2023-09-01 PROCEDURE — 1159F PR MEDICATION LIST DOCUMENTED IN MEDICAL RECORD: ICD-10-PCS | Mod: CPTII,S$GLB,, | Performed by: INTERNAL MEDICINE

## 2023-09-01 PROCEDURE — 99395 PR PREVENTIVE VISIT,EST,18-39: ICD-10-PCS | Mod: S$GLB,,, | Performed by: INTERNAL MEDICINE

## 2023-09-01 PROCEDURE — 99999 PR PBB SHADOW E&M-EST. PATIENT-LVL IV: CPT | Mod: PBBFAC,,, | Performed by: INTERNAL MEDICINE

## 2023-09-01 PROCEDURE — 85025 COMPLETE CBC W/AUTO DIFF WBC: CPT | Performed by: INTERNAL MEDICINE

## 2023-09-01 PROCEDURE — 82607 VITAMIN B-12: CPT | Performed by: INTERNAL MEDICINE

## 2023-09-01 PROCEDURE — 99999 PR PBB SHADOW E&M-EST. PATIENT-LVL IV: ICD-10-PCS | Mod: PBBFAC,,, | Performed by: INTERNAL MEDICINE

## 2023-09-01 PROCEDURE — 3044F HG A1C LEVEL LT 7.0%: CPT | Mod: CPTII,S$GLB,, | Performed by: INTERNAL MEDICINE

## 2023-09-01 PROCEDURE — 1160F PR REVIEW ALL MEDS BY PRESCRIBER/CLIN PHARMACIST DOCUMENTED: ICD-10-PCS | Mod: CPTII,S$GLB,, | Performed by: INTERNAL MEDICINE

## 2023-09-01 PROCEDURE — 3079F PR MOST RECENT DIASTOLIC BLOOD PRESSURE 80-89 MM HG: ICD-10-PCS | Mod: CPTII,S$GLB,, | Performed by: INTERNAL MEDICINE

## 2023-09-01 PROCEDURE — 3008F PR BODY MASS INDEX (BMI) DOCUMENTED: ICD-10-PCS | Mod: CPTII,S$GLB,, | Performed by: INTERNAL MEDICINE

## 2023-09-01 PROCEDURE — 80053 COMPREHEN METABOLIC PANEL: CPT | Performed by: INTERNAL MEDICINE

## 2023-09-01 RX ORDER — ALBUTEROL SULFATE 90 UG/1
2 AEROSOL, METERED RESPIRATORY (INHALATION) EVERY 6 HOURS PRN
Qty: 18 G | Refills: 11 | Status: SHIPPED | OUTPATIENT
Start: 2023-09-01 | End: 2024-08-31

## 2023-09-01 RX ORDER — MELOXICAM 7.5 MG/1
7.5 TABLET ORAL DAILY
Qty: 30 TABLET | Refills: 1 | Status: SHIPPED | OUTPATIENT
Start: 2023-09-01

## 2023-09-01 RX ORDER — IBUPROFEN 600 MG/1
600 TABLET ORAL 3 TIMES DAILY PRN
COMMUNITY
End: 2023-12-06

## 2023-09-01 RX ORDER — BUDESONIDE AND FORMOTEROL FUMARATE DIHYDRATE 80; 4.5 UG/1; UG/1
2 AEROSOL RESPIRATORY (INHALATION) DAILY
Qty: 10 G | Refills: 3 | Status: SHIPPED | OUTPATIENT
Start: 2023-09-01 | End: 2024-08-31

## 2023-09-01 NOTE — PROGRESS NOTES
Subjective:       Patient ID: Ze Toscano is a 33 y.o. male.    Chief Complaint: Establish Care      HPI  Ze Toscano is a 33 y.o. male with exercise induced asthma who presents today for Establish Care    Patient reports he has been having right knee pain since March 2022.  He was seen by orthopedist but did not follow with physical therapy as his pain was improving.  Has a friend that is a physical therapist and gave him some recommendations that work for him.  Lately his knee has been bothering him and needing to take Advil up to 6 days a week.  Pain can be up to 7-8/10.  Reports no discoloration, no tingling, and no significant swelling but feels pressure with sensation of swelling.  There is no weakness.     Yesterday had a right upper quadrant pain that lasted for about an hour.  Had recent EGD with erythematous gastropathy.     Has been exercising with weightlifting and cardio workouts.  She has been using albuterol before his workouts, which helps but has been using it more often.  There is no fever, chills, or congestion but reports chest tightness that improves with treatment of asthma.  No frequent wheezing..    He has been watching his diet and exercising and weight has changed from to 114 to almost 230 lb.  Interested on testosterone pellets treatments outside [facility] and requesting workup.  No problems of erectile dysfunction or concerns with energy/performance. No trouble urinating.    Has no toxic habits.  He is a nurse at the endoscopy unit.    Health Maintenance:  Health Maintenance   Topic Date Due    Hepatitis C Screening  Never done    TETANUS VACCINE  Never done    Lipid Panel  Completed       Review of Systems   Constitutional: Negative.  Negative for fever and unexpected weight change.   HENT: Negative.     Respiratory:  Positive for chest tightness (with exercise, alb helps) and shortness of breath.    Cardiovascular:  Positive for palpitations (once daily for a second, fish  oil).   Gastrointestinal: Negative.    Genitourinary:  Negative for difficulty urinating and erectile dysfunction.   Musculoskeletal:  Positive for arthralgias (right knee) and back pain.   Integumentary:  Negative.   Neurological: Negative.    Psychiatric/Behavioral: Negative.        Past Medical History:   Diagnosis Date    Asthma     Right knee pain        Past Surgical History:   Procedure Laterality Date    APPENDECTOMY  2005    ESOPHAGOGASTRODUODENOSCOPY N/A 08/29/2023    Procedure: EGD (ESOPHAGOGASTRODUODENOSCOPY);  Surgeon: Glen Parada MD;  Location: [facility];  Service: Endoscopy;  Laterality: N/A;    WISDOM TOOTH EXTRACTION         Family History   Problem Relation Age of Onset    Gallbladder disease Mother     Diabetes Father     Mitral valve prolapse Father     Neuropathy Father     Emphysema Maternal Grandmother     Emphysema Maternal Grandfather        Social History     Socioeconomic History    Marital status: Single   Tobacco Use    Smoking status: Never    Smokeless tobacco: Never   Substance and Sexual Activity    Alcohol use: Yes     Alcohol/week: 10.0 standard drinks of alcohol     Types: 10 Glasses of wine per week     Comment: social drinker    Drug use: Never    Sexual activity: Yes     Partners: Female       Current Outpatient Medications   Medication Sig Dispense Refill    ibuprofen (ADVIL,MOTRIN) 600 MG tablet Take 600 mg by mouth 3 (three) times daily as needed.      ketoconazole (NIZORAL) 2 % shampoo Apply topically twice a week. 120 mL 2    albuterol (PROVENTIL HFA) 90 mcg/actuation inhaler Inhale 2 puffs into the lungs every 6 (six) hours as needed for Wheezing. Rescue 18 g 11    budesonide-formoterol 80-4.5 mcg (SYMBICORT) 80-4.5 mcg/actuation HFAA Inhale 2 puffs into the lungs Daily. Controller 10 g 3    meloxicam (MOBIC) 7.5 MG tablet Take 1 tablet (7.5 mg total) by mouth once daily. 30 tablet 1     No current facility-administered medications for this visit.        Review of patient's allergies indicates:   Allergen Reactions    House dust     Latex, natural rubber Hives         Objective:       Last 3 sets of Vitals        11/29/2022     8:45 AM 8/29/2023     9:41 AM 9/1/2023     8:03 AM   Vitals - 1 value per visit   SYSTOLIC 144 129 128   DIASTOLIC 78 65 86   Pulse 77 63 70   Temp  98.6 °F (37 °C)    Resp  16    SPO2  98 % 98 %   Weight (lb) 225.75 228 227.52   Weight (kg) 102.4 103.42 103.2   Height 6' (1.829 m) 6' (1.829 m) 6' (1.829 m)   BMI (Calculated) 30.6 30.9 30.8   Pain Score Zero  Zero   Physical Exam  Constitutional:       General: He is not in acute distress.  HENT:      Head: Normocephalic.      Right Ear: Tympanic membrane, ear canal and external ear normal.      Left Ear: Tympanic membrane, ear canal and external ear normal.      Nose: Nose normal.      Mouth/Throat:      Mouth: Mucous membranes are moist.   Eyes:      General: No scleral icterus.     Extraocular Movements: Extraocular movements intact.      Conjunctiva/sclera: Conjunctivae normal.   Neck:      Vascular: No carotid bruit.      Comments: No goiter.  Cardiovascular:      Rate and Rhythm: Normal rate and regular rhythm.      Pulses: Normal pulses.      Heart sounds: Normal heart sounds.   Pulmonary:      Effort: Pulmonary effort is normal.      Breath sounds: Normal breath sounds.   Abdominal:      General: Bowel sounds are normal. There is no distension.      Palpations: Abdomen is soft. There is no mass.      Tenderness: There is no abdominal tenderness.   Musculoskeletal:         General: No swelling.   Lymphadenopathy:      Cervical: No cervical adenopathy.   Skin:     General: Skin is warm and dry.   Neurological:      General: No focal deficit present.      Mental Status: He is alert and oriented to person, place, and time.   Psychiatric:         Mood and Affect: Mood normal.         Behavior: Behavior normal.             CBC:  Recent Labs   Lab 09/01/23  0915   WBC 4.69   RBC 4.64    Hemoglobin 14.5   Hematocrit 43.4   Platelets 210   MCV 94   MCH 31.3 H   MCHC 33.4     CMP:  Recent Labs   Lab 09/01/23 0915   Glucose 110   Calcium 9.3   Albumin 4.2   Total Protein 7.5   Sodium 140   Potassium 4.1   CO2 26   Chloride 103   BUN 17   Creatinine 1.2   Alkaline Phosphatase 64   ALT 27   AST 19   Total Bilirubin 0.8     URINALYSIS:       LIPIDS:  Recent Labs   Lab 09/01/23  0915   TSH 1.315   HDL 56   Cholesterol 192   Triglycerides 89   LDL Cholesterol 118.2   HDL/Cholesterol Ratio 29.2   Non-HDL Cholesterol 136   Total Cholesterol/HDL Ratio 3.4     TSH:  Recent Labs   Lab 09/01/23  0915   TSH 1.315       A1C:  Recent Labs   Lab 09/01/23 0915   Hemoglobin A1C 5.3       Imaging:  X-Ray Knee Complete 4 Or More Views Right  Narrative: EXAMINATION:  XR KNEE COMP 4 OR MORE VIEWS RIGHT    CLINICAL HISTORY:  Pain in right knee    TECHNIQUE:  Four views    COMPARISON:  None    FINDINGS:  The alignment is within normal limits.  No fracture.  No marrow replacement process.  Impression: No acute findings.    Electronically signed by: Chidi Geiger MD  Date:    11/29/2022  Time:    10:02      Assessment:       1. Annual physical exam    2. Chronic pain of right knee    3. Mild intermittent asthma without complication    4. RUQ pain    5. Other fatigue            Plan:       1. Annual physical exam  -     CBC Auto Differential; Future; Expected date: 09/01/2023  -     Comprehensive Metabolic Panel; Future; Expected date: 09/01/2023  -     Hemoglobin A1C; Future; Expected date: 09/01/2023  -     Lipid Panel; Future; Expected date: 09/01/2023  -     TSH; Future; Expected date: 09/01/2023  -     Vitamin D; Future; Expected date: 09/01/2023  -     PSA, Screening; Future; Expected date: 09/01/2023  -   stable physical exam with normal vital signs, BMI 30.  Body fat measurement is another wait to monitor for obesity.  - encourage continued lifestyle modifications, especially if labs are normal.  If Hormone  replacement treatment watch blood pressure, may increase his liver enzymes, blood clotting, and other side effects that will need to be followed.    2. Chronic pain of right knee  -     seen by orthopedist in the past.  Would recommend follow-up.  -   Ambulatory referral/consult to Orthopedics; Future; Expected date: 09/01/2023  -     meloxicam (MOBIC) 7.5 MG tablet; Take 1 tablet (7.5 mg total) by mouth once daily.  Dispense: 30 tablet; Refill: 1    3. Mild intermittent asthma without complication  -     has been using inhaler regularly.    - budesonide-formoterol 80-4.5 mcg (SYMBICORT) 80-4.5 mcg/actuation HFAA; Inhale 2 puffs into the lungs Daily. Controller  Dispense: 10 g; Refill: 3  -     albuterol (PROVENTIL HFA) 90 mcg/actuation inhaler; Inhale 2 puffs into the lungs every 6 (six) hours as needed for Wheezing. Rescue  Dispense: 18 g; Refill: 11  - consider PFT and pulmonary evaluation    4. RUQ pain  -     US Abdomen Complete; Future; Expected date: 09/01/2023  - recently seen by Gastroenterology with EGD.  - can try Pepcid or Prilosec over-the-counter if no gallstones and possibly having gastritis/gastropathy symptoms    5. Other fatigue  -     TSH; Future; Expected date: 09/01/2023  -     Testosterone, free; Future; Expected date: 09/01/2023  -     T3, FREE; Future; Expected date: 09/01/2023  -     ESTRADIOL; Future; Expected date: 09/01/2023  -     VITAMIN B12; Future; Expected date: 09/01/2023  -     T4, Free; Future; Expected date: 09/01/2023       Health Maintenance Due   Topic Date Due    Hepatitis C Screening  Never done    HIV Screening  Never done    TETANUS VACCINE  Never done    Pneumococcal Vaccines (Age 0-64) (2 - PCV) 05/26/2012    COVID-19 Vaccine (2 - Booster for Tammi series) 12/01/2021    Influenza Vaccine (1) 09/01/2023            Return to clinic as needed or yearly.    Mildred Alfaro MD  [facility]  Disclaimer:  This note has been generated using voice-recognition software.  There may be grammatical or spelling errors that have been missed during proof-reading

## 2023-09-02 ENCOUNTER — PATIENT MESSAGE (OUTPATIENT)
Dept: FAMILY MEDICINE | Facility: CLINIC | Age: 33
End: 2023-09-02
Payer: COMMERCIAL

## 2023-09-05 ENCOUNTER — OFFICE VISIT (OUTPATIENT)
Dept: ORTHOPEDICS | Facility: CLINIC | Age: 33
End: 2023-09-05
Payer: COMMERCIAL

## 2023-09-05 VITALS
DIASTOLIC BLOOD PRESSURE: 80 MMHG | BODY MASS INDEX: 30.81 KG/M2 | HEART RATE: 68 BPM | WEIGHT: 227.5 LBS | SYSTOLIC BLOOD PRESSURE: 149 MMHG | HEIGHT: 72 IN

## 2023-09-05 DIAGNOSIS — G89.29 CHRONIC PAIN OF RIGHT KNEE: ICD-10-CM

## 2023-09-05 DIAGNOSIS — M25.561 CHRONIC PAIN OF RIGHT KNEE: ICD-10-CM

## 2023-09-05 DIAGNOSIS — M25.361 PATELLAR INSTABILITY OF RIGHT KNEE: Primary | ICD-10-CM

## 2023-09-05 PROCEDURE — 99213 PR OFFICE/OUTPT VISIT, EST, LEVL III, 20-29 MIN: ICD-10-PCS | Mod: S$GLB,,, | Performed by: ORTHOPAEDIC SURGERY

## 2023-09-05 PROCEDURE — 1159F MED LIST DOCD IN RCRD: CPT | Mod: CPTII,S$GLB,, | Performed by: ORTHOPAEDIC SURGERY

## 2023-09-05 PROCEDURE — 3008F PR BODY MASS INDEX (BMI) DOCUMENTED: ICD-10-PCS | Mod: CPTII,S$GLB,, | Performed by: ORTHOPAEDIC SURGERY

## 2023-09-05 PROCEDURE — 99999 PR PBB SHADOW E&M-EST. PATIENT-LVL IV: ICD-10-PCS | Mod: PBBFAC,,, | Performed by: ORTHOPAEDIC SURGERY

## 2023-09-05 PROCEDURE — 3044F HG A1C LEVEL LT 7.0%: CPT | Mod: CPTII,S$GLB,, | Performed by: ORTHOPAEDIC SURGERY

## 2023-09-05 PROCEDURE — 3044F PR MOST RECENT HEMOGLOBIN A1C LEVEL <7.0%: ICD-10-PCS | Mod: CPTII,S$GLB,, | Performed by: ORTHOPAEDIC SURGERY

## 2023-09-05 PROCEDURE — 3079F DIAST BP 80-89 MM HG: CPT | Mod: CPTII,S$GLB,, | Performed by: ORTHOPAEDIC SURGERY

## 2023-09-05 PROCEDURE — 99213 OFFICE O/P EST LOW 20 MIN: CPT | Mod: S$GLB,,, | Performed by: ORTHOPAEDIC SURGERY

## 2023-09-05 PROCEDURE — 1159F PR MEDICATION LIST DOCUMENTED IN MEDICAL RECORD: ICD-10-PCS | Mod: CPTII,S$GLB,, | Performed by: ORTHOPAEDIC SURGERY

## 2023-09-05 PROCEDURE — 3008F BODY MASS INDEX DOCD: CPT | Mod: CPTII,S$GLB,, | Performed by: ORTHOPAEDIC SURGERY

## 2023-09-05 PROCEDURE — 3077F SYST BP >= 140 MM HG: CPT | Mod: CPTII,S$GLB,, | Performed by: ORTHOPAEDIC SURGERY

## 2023-09-05 PROCEDURE — 3079F PR MOST RECENT DIASTOLIC BLOOD PRESSURE 80-89 MM HG: ICD-10-PCS | Mod: CPTII,S$GLB,, | Performed by: ORTHOPAEDIC SURGERY

## 2023-09-05 PROCEDURE — 3077F PR MOST RECENT SYSTOLIC BLOOD PRESSURE >= 140 MM HG: ICD-10-PCS | Mod: CPTII,S$GLB,, | Performed by: ORTHOPAEDIC SURGERY

## 2023-09-05 PROCEDURE — 99999 PR PBB SHADOW E&M-EST. PATIENT-LVL IV: CPT | Mod: PBBFAC,,, | Performed by: ORTHOPAEDIC SURGERY

## 2023-09-05 NOTE — PROGRESS NOTES
Subjective:      Patient ID: Ze Toscano is a 33 y.o. male.    Chief Complaint: No chief complaint on file.    HPI    33yo M with history of right knee 'grinding injury' which he states occurred in April of 2022. He has had periodic swelling and pain, which is relieved with ibuprofen. He denies mechanical symptoms, catching, locking, popping. He denies instability or buckling, he denies paresthesias or weakness. He has not tried knee sleeves, bracing, or therapy. He denies previous injuries to this knee.  Since last visit patient doing home exercises.  He had a time of relief but 2 months ago without any inciting event he had a significant exacerbation of his pain.  He has been significant for the last 2 months.    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     Social History     Occupational History    Not on file   Tobacco Use    Smoking status: Never    Smokeless tobacco: Never   Substance and Sexual Activity    Alcohol use: Yes     Alcohol/week: 10.0 standard drinks of alcohol     Types: 10 Glasses of wine per week     Comment: social drinker    Drug use: Never    Sexual activity: Yes     Partners: Female      ROS      Negative except per above    Objective:    Ortho/SPM Exam       MSK  RLE  No obvious deformities, skin benign, no swelling  No TTP over the patellofemoral compartment, patella grind negative  No joint line tenderness  ROM 0-125  [facility]'s negative  Apley's negative  Knee stable to varus/valgus stress at 0/30  Negative ant/post drawer, lachman's stable  Positive apprehension test with lateral patellar pressure  Negative J-sign  Sensation intact Sa/Moss/Sp/Dp/t  Skin wwp, brisk cr      Assessment:       1. Patellar instability of right knee    2. Chronic pain of right knee          Plan:       -suspect maltracking of right patella, instructed on knee sleeve for stability, quad and hamstring strengthening  -we will try a course of PT before ordering an MRI. Patient should f/u  with me after approx 1 mo of PT to consider an MRI at that point for possible knee pathology

## 2023-09-12 ENCOUNTER — CLINICAL SUPPORT (OUTPATIENT)
Dept: REHABILITATION | Facility: HOSPITAL | Age: 33
End: 2023-09-12
Attending: ORTHOPAEDIC SURGERY
Payer: COMMERCIAL

## 2023-09-12 DIAGNOSIS — M25.361 PATELLAR INSTABILITY OF RIGHT KNEE: ICD-10-CM

## 2023-09-12 DIAGNOSIS — R29.898 WEAKNESS OF BOTH HIPS: ICD-10-CM

## 2023-09-12 DIAGNOSIS — M62.81 QUADRICEPS WEAKNESS: ICD-10-CM

## 2023-09-12 DIAGNOSIS — M25.661 DECREASED RANGE OF MOTION (ROM) OF RIGHT KNEE: ICD-10-CM

## 2023-09-12 PROCEDURE — 97162 PT EVAL MOD COMPLEX 30 MIN: CPT | Mod: PN

## 2023-09-12 PROCEDURE — 97530 THERAPEUTIC ACTIVITIES: CPT | Mod: PN

## 2023-09-12 NOTE — PLAN OF CARE
[facility]   Physical Therapy Initial Evaluation        Date: 9/12/2023   Name: Kinga Toscano  Clinic Number: [id number]    Therapy Diagnosis:   Encounter Diagnoses   Name Primary?    Patellar instability of right knee     Quadriceps weakness     Weakness of both hips     Decreased range of motion (ROM) of right knee      Physician: Nikko Stanley MD    Physician Orders: PT Eval and Treat   Medical Diagnosis from Referral: M25.361 (ICD-10-CM) - Patellar instability of right knee  Evaluation Date: 9/12/2023  Authorization Period Expiration: 9/12/2024  Plan of Care Expiration: 10/26/2023  Progress Note Due: 10/3/2023  Visit # / Visits authorized: 1/ 20   FOTO: 1/5    Precautions: Standard     Time In: 10:05 am   Time Out: 11:03 am   Total Billable Time: 58 minutes    Subjective     Date of onset: April 2022    History of current condition - KINGA reports he was doing a dead lift at work when he felt a grinding motion and velcro sound in his right knee as if something was tearing in April 2022. Had on and off pain the year of 2022 - did not inhibit any functional activities. Around November/December 2022, pain became more frequent. In the past two months, symptoms increased significantly. Increased pain with extended standing, quad stretching, twisting, and squatting. Denies knee buckling, locking, clicking. Sometimes knee get stiff and he can twist his leg and get an audible pop with relief of symptoms. Denies history of knee cap dislocations.     Falls: none     Imaging: none in EMR    Prior Therapy: yes, different body part.   Social History: lives in apartment on first floor with fiance  Occupation: Nurse   Prior Level of Function: independent   Current Level of Function: inhibits him from squatting heavy. Still working out - using modifications and body weight strengthening.     Pain:  Current 1/10, worst 8/10, best 0/10   Location: right knee    Description: Aching, Dull, Sharp,  "and Pressure   Aggravating Factors: Standing, squatting, dead lifting  Easing Factors: ibuprofen, hip/knee mobility exercises.    Patients goals: return to dead lifting, improve hip/knee/ankle mobility, figure out what to do and to avoid.     Medical History:   Past Medical History:   Diagnosis Date    Asthma     Right knee pain        Surgical History:   Ze Toscano  has a past surgical history that includes Appendectomy (2005); Esophagogastroduodenoscopy (N/A, 08/29/2023); and [place] tooth extraction.    Medications:   Ze has a current medication list which includes the following prescription(s): albuterol, budesonide-formoterol 80-4.5 mcg, ibuprofen, ketoconazole, and meloxicam.    Allergies:   Review of patient's allergies indicates:   Allergen Reactions    House dust     Latex, natural rubber Hives        Objective        Palpation: no tenderness to gross palpation of medial/lateral joint line, tibial tuberosity, quad/patellar tendon, patella     Posture: bilateral knee hyperextension in stance     Gross Movement Analysis:  - Squats: able to squat past 90 degrees. Complaints of lateral aspect of patellar pressure at end range.   - Lateral step down from 6" stool: Right sided valgus collapse with single upper extremity support.  - Stairs: no deficits     Range of Motion:   Right: -3-0-125 degrees  Left: -7-0-132 degrees       Lower Extremity Strength                 LE           Right           Left   Hip flexion: 18.8 kg 18.6 kg   Hip abduction 16.7 kg 20.3 kg   Knee flexion 17.3 kg 16.5 kg   Knee extension 49.7 kg 43.6 kg   Ankle dorsiflexion: 5/5 5/5   Ankle plantarflexion: 5/5 5/5     Special Tests:   Right Left   Valgus Stress Test (-) NT   Varus Stress test (-) NT   Lachman's test (-) NT   Posterior drawer (-) NT   Anterior drawer (-) NT   Alena's Test (-) NT   Thessaly's Test (-) NT   Patellar Grind Test (-) NT     Joint Mobility: Patellar - hypermobile lateral glide on right " "    Flexibility:    Ely's test: (+) bilateral    Hamstring 90/90: limited      Intake Outcome Measure for FOTO knee Survey    Therapist reviewed FOTO scores for Kinga Toscano on 9/12/2023.   FOTO report - see Media section or FOTO account episode details.    Intake Score: 44%         Treatment     Total Treatment time (time-based codes) separate from Evaluation: 10 minutes     KINGA received the treatments listed below:      therapeutic activities to improve functional performance for 10  minutes, including:  Quad set: 5x5"   SLR: 10x   SL hip abduction: 10x   Single leg bridge: 10x   Education on activity modifications       Patient Education and Home Exercises     Education provided:   - Findings; prognosis and plan of care  - Home exercise program  - Modality options  - Therapist contact information  - Avoid deep squatting past 90 degrees, proper exercise to perform at gym and exercises to avoid, avoid hyperextension in stance and physical activities     Written Home Exercises Provided: yes. Exercises were reviewed and KINGA was able to demonstrate them prior to the end of the session.  KINGA demonstrated good  understanding of the education provided. See EMR under Patient Instructions for exercises provided during therapy sessions.    Assessment     Kinga is a 33 y.o. male referred to outpatient Physical Therapy with a medical diagnosis of M25.361 (ICD-10-CM) - Patellar instability of right knee. Patient presents with signs and symptoms of medical diagnosis. Hip abductor weakness contributing. No noticeable patellar mal tracking visible. Cleared meniscus pathology. Heavy education on proper exercise performance to avoid increased load on knee. Good tolerance to exercise program provided.     Patient prognosis is Excellent.   Patient will benefit from skilled outpatient Physical Therapy to address the deficits stated above and in the chart below, provide patient /family education, and to maximize " patientt's level of independence.     Plan of care discussed with patient: Yes  Patient's spiritual, cultural and educational needs considered and patient is agreeable to the plan of care and goals as stated below:     Anticipated Barriers for therapy: chronicity     Medical Necessity is demonstrated by the following  History  Co-morbidities and personal factors that may impact the plan of care [] LOW: no personal factors / co-morbidities  [x] MODERATE: 1-2 personal factors / co-morbidities  [] HIGH: 3+ personal factors / co-morbidities    Moderate / High Support Documentation:   Co-morbidities affecting plan of care: asthma     Personal Factors:   lifestyle     Examination  Body Structures and Functions, activity limitations and participation restrictions that may impact the plan of care [] LOW: addressing 1-2 elements  [x] MODERATE: 3+ elements  [] HIGH: 4+ elements (please support below)    Moderate / High Support Documentation: Based on PMHX, co morbidities , data from assessments and functional level of assistance required with task and clinical presentation directly impacting function.         Clinical Presentation [] LOW: stable  [x] MODERATE: Evolving  [] HIGH: Unstable     Decision Making/ Complexity Score: moderate       Goals:  Short Term Goals (3 Weeks):  1. Patient will be compliant with home exercise program to supplement therapy in restoring pain free function.  2. Patient will improve impaired lower extremity Microfet Testing by  >/= 5 kg to improve dynamic hip/knee support for functional tasks.  3. Patient will improve right knee flexion active range of motion to 130 degrees to improve functional mobility.     Long Term Goals (6 Weeks):  1. Patient will improve FOTO score to </= 24% limited to decrease perceived limitation with mobility.   2. Patient will improve impaired quadriceps and hip abduction MicroFet Testing by >/= 10 kg to improve dynamic hip/knee support for functional tasks.  3. Patient  will be able to deep squat with 50 lb barbell to return to heavy loading activities.   4. Patient will be able to perform 35 lb goblet squat to return to squatting activities at gym.  5. Patient will be able to return to prior exercise routine to return to prior level of function.  Plan     Plan of care Certification: 9/12/2023 to 10/26/2023.    Outpatient Physical Therapy 1 times weekly for 6 weeks to include the following interventions: Gait Training, Manual Therapy, Moist Heat/ Ice, Neuromuscular Re-ed, Patient Education, Self Care, Therapeutic Activities, and Therapeutic Exercise.     Mehreen Covarrubias PT        Physician's Signature: _________________________________________ Date: ________________

## 2023-09-21 ENCOUNTER — CLINICAL SUPPORT (OUTPATIENT)
Dept: REHABILITATION | Facility: HOSPITAL | Age: 33
End: 2023-09-21
Payer: COMMERCIAL

## 2023-09-21 DIAGNOSIS — R29.898 WEAKNESS OF BOTH HIPS: ICD-10-CM

## 2023-09-21 DIAGNOSIS — M25.661 DECREASED RANGE OF MOTION (ROM) OF RIGHT KNEE: ICD-10-CM

## 2023-09-21 DIAGNOSIS — M62.81 QUADRICEPS WEAKNESS: Primary | ICD-10-CM

## 2023-09-21 PROCEDURE — 97110 THERAPEUTIC EXERCISES: CPT | Mod: PN,CQ

## 2023-09-21 PROCEDURE — 97112 NEUROMUSCULAR REEDUCATION: CPT | Mod: PN,CQ

## 2023-09-21 NOTE — PROGRESS NOTES
"[facility]   Physical Therapy Treatment Note      Name: Kinga Toscano  Clinic Number: [id number]    Therapy Diagnosis: No diagnosis found.  Physician: Nikko Stanley MD    Visit Date: 9/21/2023    Encounter Diagnoses   Name Primary?    Patellar instability of right knee      Quadriceps weakness      Weakness of both hips      Decreased range of motion (ROM) of right knee        Physician: Nikko Stanley MD     Physician Orders: PT Eval and Treat   Medical Diagnosis from Referral: M25.361 (ICD-10-CM) - Patellar instability of right knee  Evaluation Date: 9/12/2023  Authorization Period Expiration: 9/12/2024  Plan of Care Expiration: 10/26/2023  Progress Note Due: 10/3/2023  Visit # / Visits authorized: 1/ 20   FOTO: 1/5     Precautions: Standard      Time In: 3:00  Time Out: 3:55  Total Billable Time: 58 minutes    PTA Visit #: 1/5       Subjective     Patient reports: doing okay no pain. Does the home exercise program ..  He was compliant with home exercise program.  Response to previous treatment: no adverse effects  Functional change: Ongoing    Pain: 0/10  Location: right knee     Objective      Objective Measures updated at progress report unless specified.     Treatment     KINGA received the treatments listed below:      therapeutic exercises to develop strength, endurance, ROM, flexibility, posture, and core stabilization for 15 minutes including: Additional time supervised  Quad set: 15x5"   SLR: 10x2   SL hip abduction: 10x2   Prone Hip Extension 2x10   Bridges 2x10 double leg   Single leg bridge: 10x2   Heel raises 3x10 double leg   Cybex long arc quad 2x10 right single leg 20#  Cybex HSC 2x10 with 4 plates  Cybex Leg press double leg 2x10 7 plates; single leg right 2x10 with 5 plates      manual therapy techniques: Joint mobilizations, Manual traction, Myofacial release, Soft tissue Mobilization, and Friction Massage were applied to the: right knee  for 0 minutes, " "including:  NP    neuromuscular re-education activities to improve: Balance, Coordination, Kinesthetic, Sense, Proprioception, and Posture for  minutes. The following activities were included:  Pistol squats form lg step 2x10     therapeutic activities to improve functional performance for 15  minutes, including: Additional kayla supervised   Fwd Step ups to 6" step x15 reps bilateral Lead  Lateral step ups to 6" step x15 reps bilateral lead  Shuttle press in Side lying 2x10 with 1.5 straps             Patient Education and Home Exercises       Education provided:   - home exercise program review    Written Home Exercises Provided: Patient instructed to cont prior HEP. Exercises were reviewed and KINGA was able to demonstrate them prior to the end of the session.  KINGA demonstrated good  understanding of the education provided. See Electronic Medical Record under Patient Instructions for exercises provided during therapy sessions    Assessment     Otoniel tolerated his first follow up well. Otoniel was compliant with his home exercise program and these exercises are well tolerated We initiated graded loading of left knee in closed and open chain.     KINGA Is progressing well towards his goals.   Patient prognosis is Excellent.     Patient will continue to benefit from skilled outpatient physical therapy to address the deficits listed in the problem list box on initial evaluation, provide pt/family education and to maximize pt's level of independence in the home and community environment.     Patient's spiritual, cultural and educational needs considered and pt agreeable to plan of care and goals.     Anticipated barriers to physical therapy: chronicity     Goals:   Short Term Goals (3 Weeks):  1. Patient will be compliant with home exercise program to supplement therapy in restoring pain free function.  2. Patient will improve impaired lower extremity Microfet Testing by  >/= 5 kg to improve dynamic hip/knee support " for functional tasks.  3. Patient will improve right knee flexion active range of motion to 130 degrees to improve functional mobility.      Long Term Goals (6 Weeks):  1. Patient will improve FOTO score to </= 24% limited to decrease perceived limitation with mobility.   2. Patient will improve impaired quadriceps and hip abduction MicroFet Testing by >/= 10 kg to improve dynamic hip/knee support for functional tasks.  3. Patient will be able to deep squat with 50 lb barbell to return to heavy loading activities.   4. Patient will be able to perform 35 lb goblet squat to return to squatting activities at gym.  5. Patient will be able to return to prior exercise routine to return to prior level of function.  Plan     Continue PT plan of care     Ajit Johnson, PTA

## 2023-09-26 ENCOUNTER — HOSPITAL ENCOUNTER (OUTPATIENT)
Dept: RADIOLOGY | Facility: HOSPITAL | Age: 33
Discharge: HOME | End: 2023-09-26
Attending: INTERNAL MEDICINE
Payer: COMMERCIAL

## 2023-09-26 DIAGNOSIS — R10.11 RUQ PAIN: ICD-10-CM

## 2023-09-26 PROCEDURE — 76705 ECHO EXAM OF ABDOMEN: CPT | Mod: TC

## 2023-09-26 PROCEDURE — 76705 ECHO EXAM OF ABDOMEN: CPT | Mod: 26,,, | Performed by: STUDENT IN AN ORGANIZED HEALTH CARE EDUCATION/TRAINING PROGRAM

## 2023-09-26 PROCEDURE — 76705 US ABDOMEN LIMITED: ICD-10-PCS | Mod: 26,,, | Performed by: STUDENT IN AN ORGANIZED HEALTH CARE EDUCATION/TRAINING PROGRAM

## 2023-09-27 ENCOUNTER — PATIENT MESSAGE (OUTPATIENT)
Dept: FAMILY MEDICINE | Facility: CLINIC | Age: 33
End: 2023-09-27
Payer: COMMERCIAL

## 2023-09-28 ENCOUNTER — CLINICAL SUPPORT (OUTPATIENT)
Dept: REHABILITATION | Facility: HOSPITAL | Age: 33
End: 2023-09-28
Payer: COMMERCIAL

## 2023-09-28 DIAGNOSIS — M62.81 QUADRICEPS WEAKNESS: Primary | ICD-10-CM

## 2023-09-28 DIAGNOSIS — R29.898 WEAKNESS OF BOTH HIPS: ICD-10-CM

## 2023-09-28 DIAGNOSIS — M25.661 DECREASED RANGE OF MOTION (ROM) OF RIGHT KNEE: ICD-10-CM

## 2023-09-28 PROCEDURE — 97110 THERAPEUTIC EXERCISES: CPT | Mod: PN

## 2023-09-28 PROCEDURE — 97112 NEUROMUSCULAR REEDUCATION: CPT | Mod: PN

## 2023-09-28 PROCEDURE — 97140 MANUAL THERAPY 1/> REGIONS: CPT | Mod: PN

## 2023-09-28 NOTE — PROGRESS NOTES
"[facility]   Physical Therapy Treatment Note      Name: Kinga Fierroer  Clinic Number: [id number]    Therapy Diagnosis:   Encounter Diagnoses   Name Primary?    Quadriceps weakness Yes    Weakness of both hips     Decreased range of motion (ROM) of right knee      Physician: Nikko Stanley MD    Visit Date: 9/28/2023     Physician Orders: PT Eval and Treat   Medical Diagnosis from Referral: M25.361 (ICD-10-CM) - Patellar instability of right knee  Evaluation Date: 9/12/2023  Authorization Period Expiration: 9/12/2024  Plan of Care Expiration: 10/26/2023  Progress Note Due: 10/3/2023  Visit # / Visits authorized: 2/20   FOTO: 1/5     Precautions: Standard      Time In: 3:05 pm  Time Out: 4:00 pm  Total Billable Time: 55 minutes    PTA Visit #: 1/5       Subjective     Patient reports: flared up for a few days after last session. He was doing some stretches with knee extended. Examples that increase pain: eccentric calf strengthening, stretching bending forwards with knees straight. Always feels a sustained pressure in posterior capsule of knee.    He was compliant with home exercise program.  Response to previous treatment: no adverse effects  Functional change: Ongoing    Pain: 0/10  Location: right knee     Objective      Objective Measures updated at progress report unless specified.     Treatment     KINGA received the treatments listed below:      therapeutic exercises to develop strength, endurance, ROM, flexibility, posture, and core stabilization for 35 minutes including: Additional time supervised    LAQ: 10# - 3x10 right   Cybex double knee: 10 plates - 3x10  Long lever bridges on 12" step: 3x10  Lateral band walks: green - 45 feet 3 laps  Monster walks: green - 45 feet 3 laps   Cybex hip extension: 3 plates - 3x10      manual therapy techniques: Joint mobilizations, Manual traction, Myofacial release, Soft tissue Mobilization, and Friction Massage were applied to the: right " "knee  for 10 minutes, including:  STM to gastroc-soleus and posterior knee capsule     neuromuscular re-education activities to improve: Balance, Coordination, Kinesthetic, Sense, Proprioception, and Posture for 10 minutes. The following activities were included:    Pistol squats - 24" step with 10# dumbbell 2x10 right   Step up with eccentric lowerin" step + 12lb kettle bell - 2x10 right  Swiss ball hamstring curls: 2x10      Patient Education and Home Exercises       Education provided:   - home exercise program review    Written Home Exercises Provided: Patient instructed to cont prior HEP. Exercises were reviewed and KINGA was able to demonstrate them prior to the end of the session.  KINGA demonstrated good  understanding of the education provided. See Electronic Medical Record under Patient Instructions for exercises provided during therapy sessions    Assessment     Kinga is a 33 y.o. male referred to outpatient Physical Therapy with a medical diagnosis of M25.361 (ICD-10-CM) - Patellar instability of right knee. Hard to pinpoint diagnosis secondary to vague symptom description. Diagnosis includes potential baker's cyst or quadriceps/hamstring muscular imbalance. Complaints of posterior knee capsule pressure, particularly with end-range extension positions. Performed soft tissue mobilizations to posterior knee capsule and gastroc-soleus with improve mobility and feelings of "pressure" following. Heavy emphasis on hamstring strengthening as well as quadriceps eccentric neuromuscular control. Will monitor response next visit.    KINGA Is progressing well towards his goals.   Patient prognosis is Excellent.     Patient will continue to benefit from skilled outpatient physical therapy to address the deficits listed in the problem list box on initial evaluation, provide pt/family education and to maximize pt's level of independence in the home and community environment.     Patient's spiritual, " cultural and educational needs considered and pt agreeable to plan of care and goals.     Anticipated barriers to physical therapy: chronicity     Goals:   Short Term Goals (3 Weeks):  1. Patient will be compliant with home exercise program to supplement therapy in restoring pain free function.  2. Patient will improve impaired lower extremity Microfet Testing by  >/= 5 kg to improve dynamic hip/knee support for functional tasks.  3. Patient will improve right knee flexion active range of motion to 130 degrees to improve functional mobility.      Long Term Goals (6 Weeks):  1. Patient will improve FOTO score to </= 24% limited to decrease perceived limitation with mobility.   2. Patient will improve impaired quadriceps and hip abduction MicroFet Testing by >/= 10 kg to improve dynamic hip/knee support for functional tasks.  3. Patient will be able to deep squat with 50 lb barbell to return to heavy loading activities.   4. Patient will be able to perform 35 lb goblet squat to return to squatting activities at gym.  5. Patient will be able to return to prior exercise routine to return to prior level of function.  Plan     Continue PT plan of care     Mehreen Covarrubias, PT

## 2023-10-05 ENCOUNTER — CLINICAL SUPPORT (OUTPATIENT)
Dept: REHABILITATION | Facility: HOSPITAL | Age: 33
End: 2023-10-05
Payer: COMMERCIAL

## 2023-10-05 DIAGNOSIS — R29.898 WEAKNESS OF BOTH HIPS: ICD-10-CM

## 2023-10-05 DIAGNOSIS — M25.661 DECREASED RANGE OF MOTION (ROM) OF RIGHT KNEE: ICD-10-CM

## 2023-10-05 DIAGNOSIS — M62.81 QUADRICEPS WEAKNESS: Primary | ICD-10-CM

## 2023-10-05 PROCEDURE — 97112 NEUROMUSCULAR REEDUCATION: CPT | Mod: PN

## 2023-10-05 PROCEDURE — 97140 MANUAL THERAPY 1/> REGIONS: CPT | Mod: PN

## 2023-10-05 NOTE — PROGRESS NOTES
"[facility]   Physical Therapy Treatment Note      Name: Kinga Fierroer  Clinic Number: [id number]    Therapy Diagnosis:   Encounter Diagnoses   Name Primary?    Quadriceps weakness Yes    Weakness of both hips     Decreased range of motion (ROM) of right knee      Physician: Nikko Stanley MD    Visit Date: 10/5/2023     Physician Orders: PT Eval and Treat   Medical Diagnosis from Referral: M25.361 (ICD-10-CM) - Patellar instability of right knee  Evaluation Date: 9/12/2023  Authorization Period Expiration: 9/12/2024  Plan of Care Expiration: 10/26/2023  Progress Note Due: 10/3/2023  Visit # / Visits authorized: 3/20   FOTO: 1/5     Precautions: Standard      Time In: 3:05 pm  Time Out: 4:00 pm  Total Billable Time: 55 minutes    PTA Visit #: 1/5       Subjective     Patient reports: he has been doing well since previous visit. Took out deep squatting. Tightness in hip flexor. Next day following last session, right knee was sore superior pole patellar and posterior knee capsule.     He was compliant with home exercise program.  Response to previous treatment: no adverse effects  Functional change: Ongoing    Pain: 0/10  Location: right knee     Objective      Objective Measures updated at progress report unless specified.     See EMR under MEDIA for written consent provided.    Palpation Assessment to determine the necessity for Functional Dry Needling to right gastroc-soleus.     Treatment     KINGA received the treatments listed below:      therapeutic exercises to develop strength, endurance, ROM, flexibility, posture, and core stabilization for 00 minutes including: Additional time supervised    LAQ: 10# - 3x10 right   Cybex double knee: 10 plates - 3x10  Long lever bridges on 12" step: 3x10  Lateral band walks: green - 45 feet 3 laps  Monster walks: green - 45 feet 3 laps   Cybex hip extension: 3 plates - 3x10    manual therapy techniques: Joint mobilizations, Manual traction, " "Myofacial release, Soft tissue Mobilization, and Friction Massage were applied to the: right knee  for 30 minutes, including:    STM to gastroc-soleus and posterior knee capsule     Kinga was agreeable to dry needling by a certified dry needling PT and signed a consent prior to treatment after being made aware of risks. 40mm needle was inserted into the lateral gastroc head. Periosteal pecking, winding for grasp, and skin sliding were performed. Afterwards, needle was removed and placed into a sharps container. Patient demonstrated appropriate response to Functional Dry Needling. good muscle twitch response observed to treated muscle groups.     neuromuscular re-education activities to improve: Balance, Coordination, Kinesthetic, Sense, Proprioception, and Posture for 25 minutes. The following activities were included:    Pistol squats - 24" step with 10# dumbbell 2x10 right  Step up with eccentric lowerin" step + 10lb dumbbell - 2x10 right  Barbell squats: 10# - 2x10  Hip hinge with barbell: 10# - 2x10  Cybex knee extension isometric: 10x10"    OOT:  Swiss ball hamstring curls: 2x10      Patient Education and Home Exercises       Education provided:   - home exercise program review  - dry needling purpose, risk factors, and goals     Written Home Exercises Provided: Patient instructed to cont prior HEP. Exercises were reviewed and KINGA was able to demonstrate them prior to the end of the session.  KINGA demonstrated good  understanding of the education provided. See Electronic Medical Record under Patient Instructions for exercises provided during therapy sessions    Assessment     Kinga is a 33 y.o. male referred to outpatient Physical Therapy with a medical diagnosis of M25.361 (ICD-10-CM) - Patellar instability of right knee. Lateral gastroc head trigger point found upon soft tissue mobilizations to calf and posterior knee capsule. Therapist suggested functional dry needling to right calf to " improve posterior knee tightness. Kinga was agreeable to dry needling by a certified dry needling PT and signed a consent prior to treatment after being made aware of risks. Patient demonstrated appropriate response to Functional Dry Needling. good  muscle twitch contractions observed to treated muscle groups. Lateral calf soreness and tightness following. Assured patient this was normal. Followed with eccentric quadriceps, posterolateral hip, and dynamic single leg balance strength training. Some complaints of suprapatellar irritation following step ups and squats. Improved following knee extension isometrics. Will monitor response to next session.     KINGA Is progressing well towards his goals.   Patient prognosis is Excellent.     Patient will continue to benefit from skilled outpatient physical therapy to address the deficits listed in the problem list box on initial evaluation, provide pt/family education and to maximize pt's level of independence in the home and community environment.     Patient's spiritual, cultural and educational needs considered and pt agreeable to plan of care and goals.     Anticipated barriers to physical therapy: chronicity     Goals:   Short Term Goals (3 Weeks):  1. Patient will be compliant with home exercise program to supplement therapy in restoring pain free function.  2. Patient will improve impaired lower extremity Microfet Testing by  >/= 5 kg to improve dynamic hip/knee support for functional tasks.  3. Patient will improve right knee flexion active range of motion to 130 degrees to improve functional mobility.      Long Term Goals (6 Weeks):  1. Patient will improve FOTO score to </= 24% limited to decrease perceived limitation with mobility.   2. Patient will improve impaired quadriceps and hip abduction MicroFet Testing by >/= 10 kg to improve dynamic hip/knee support for functional tasks.  3. Patient will be able to deep squat with 50 lb barbell to return to heavy  loading activities.   4. Patient will be able to perform 35 lb goblet squat to return to squatting activities at gym.  5. Patient will be able to return to prior exercise routine to return to prior level of function.  Plan     Continue PT plan of care     Mehreen Covarrubias, PT

## 2023-10-11 NOTE — PROGRESS NOTES
"[facility]   Physical Therapy Treatment Note      Name: Kinga Toscano  Clinic Number: [id number]    Therapy Diagnosis:   No diagnosis found.    Physician: Nikko Stanley MD    Visit Date: 10/12/2023     Physician Orders: PT Eval and Treat   Medical Diagnosis from Referral: M25.361 (ICD-10-CM) - Patellar instability of right knee  Evaluation Date: 9/12/2023  Authorization Period Expiration: 9/12/2024  Plan of Care Expiration: 10/26/2023  Progress Note Due: 10/3/2023  Visit # / Visits authorized: 4/20   FOTO: 2/5     Precautions: Standard      Time In: 3:05 pm  Time Out: 4:00 pm  Total Billable Time: 55 minutes    PTA Visit #: 1/5       Subjective     Patient reports: he has been doing well since previous visit. Took out deep squatting. Tightness in hip flexor. Next day following last session, right knee was sore superior pole patellar and posterior knee capsule.     He was compliant with home exercise program.  Response to previous treatment: no adverse effects  Functional change: Ongoing    Pain: 0/10  Location: right knee     Objective      Objective Measures updated at progress report unless specified.     See EMR under MEDIA for written consent provided.    Palpation Assessment to determine the necessity for Functional Dry Needling to right gastroc-soleus.     Treatment     KINGA received the treatments listed below:      therapeutic exercises to develop strength, endurance, ROM, flexibility, posture, and core stabilization for 00 minutes including: Additional time supervised    LAQ: 10# - 3x10 right   Cybex double knee: 10 plates - 3x10  Long lever bridges on 12" step: 3x10  Lateral band walks: green - 45 feet 3 laps  Monster walks: green - 45 feet 3 laps   Cybex hip extension: 3 plates - 3x10    manual therapy techniques: Joint mobilizations, Manual traction, Myofacial release, Soft tissue Mobilization, and Friction Massage were applied to the: right knee  for 30 minutes, " "including:    STM to gastroc-soleus and posterior knee capsule     Kinga was agreeable to dry needling by a certified dry needling PT and signed a consent prior to treatment after being made aware of risks. 40mm needle was inserted into the lateral gastroc head. Periosteal pecking, winding for grasp, and skin sliding were performed. Afterwards, needle was removed and placed into a sharps container. Patient demonstrated appropriate response to Functional Dry Needling. good muscle twitch response observed to treated muscle groups.     neuromuscular re-education activities to improve: Balance, Coordination, Kinesthetic, Sense, Proprioception, and Posture for 25 minutes. The following activities were included:    Pistol squats - 24" step with 10# dumbbell 2x10 right  Step up with eccentric lowerin" step + 10lb dumbbell - 2x10 right  Barbell squats: 10# - 2x10  Hip hinge with barbell: 10# - 2x10  Cybex knee extension isometric: 10x10"    OOT:  Swiss ball hamstring curls: 2x10      Patient Education and Home Exercises       Education provided:   - home exercise program review  - dry needling purpose, risk factors, and goals     Written Home Exercises Provided: Patient instructed to cont prior HEP. Exercises were reviewed and KINGA was able to demonstrate them prior to the end of the session.  KINGA demonstrated good  understanding of the education provided. See Electronic Medical Record under Patient Instructions for exercises provided during therapy sessions    Assessment     Kinga is a 33 y.o. male referred to outpatient Physical Therapy with a medical diagnosis of M25.361 (ICD-10-CM) - Patellar instability of right knee. Lateral gastroc head trigger point found upon soft tissue mobilizations to calf and posterior knee capsule.  Lateral calf soreness and tightness following. Assured patient this was normal. Followed with eccentric quadriceps, posterolateral hip, and dynamic single leg balance strength " training. Some complaints of suprapatellar irritation following step ups and squats. Improved following knee extension isometrics. Will monitor response to next session.     KINGA Is progressing well towards his goals.   Patient prognosis is Excellent.     Patient will continue to benefit from skilled outpatient physical therapy to address the deficits listed in the problem list box on initial evaluation, provide pt/family education and to maximize pt's level of independence in the home and community environment.     Patient's spiritual, cultural and educational needs considered and pt agreeable to plan of care and goals.     Anticipated barriers to physical therapy: chronicity     Goals:   Short Term Goals (3 Weeks):  1. Patient will be compliant with home exercise program to supplement therapy in restoring pain free function.  2. Patient will improve impaired lower extremity Microfet Testing by  >/= 5 kg to improve dynamic hip/knee support for functional tasks.  3. Patient will improve right knee flexion active range of motion to 130 degrees to improve functional mobility.      Long Term Goals (6 Weeks):  1. Patient will improve FOTO score to </= 24% limited to decrease perceived limitation with mobility.   2. Patient will improve impaired quadriceps and hip abduction MicroFet Testing by >/= 10 kg to improve dynamic hip/knee support for functional tasks.  3. Patient will be able to deep squat with 50 lb barbell to return to heavy loading activities.   4. Patient will be able to perform 35 lb goblet squat to return to squatting activities at gym.  5. Patient will be able to return to prior exercise routine to return to prior level of function.  Plan     Continue PT plan of care     Ajit Johnson, FATOUMATA

## 2023-10-12 ENCOUNTER — CLINICAL SUPPORT (OUTPATIENT)
Dept: REHABILITATION | Facility: HOSPITAL | Age: 33
End: 2023-10-12
Payer: COMMERCIAL

## 2023-10-12 DIAGNOSIS — M62.81 QUADRICEPS WEAKNESS: Primary | ICD-10-CM

## 2023-10-12 DIAGNOSIS — R29.898 WEAKNESS OF BOTH HIPS: ICD-10-CM

## 2023-10-12 DIAGNOSIS — M25.661 DECREASED RANGE OF MOTION (ROM) OF RIGHT KNEE: ICD-10-CM

## 2023-10-12 PROCEDURE — 97140 MANUAL THERAPY 1/> REGIONS: CPT | Mod: PN

## 2023-10-12 PROCEDURE — 97530 THERAPEUTIC ACTIVITIES: CPT | Mod: PN

## 2023-10-12 PROCEDURE — 97110 THERAPEUTIC EXERCISES: CPT | Mod: PN

## 2023-10-12 NOTE — PROGRESS NOTES
[facility]   Physical Therapy Treatment Note      Name: Kinga Gerardo Stephan  Clinic Number: [id number]    Therapy Diagnosis:   Encounter Diagnoses   Name Primary?    Quadriceps weakness Yes    Weakness of both hips     Decreased range of motion (ROM) of right knee      Physician: Nikko Stanley MD    Visit Date: 10/12/2023     Physician Orders: PT Eval and Treat   Medical Diagnosis from Referral: M25.361 (ICD-10-CM) - Patellar instability of right knee  Evaluation Date: 9/12/2023  Authorization Period Expiration: 9/12/2024  Plan of Care Expiration: 10/26/2023  Progress Note Due: 10/3/2023  Visit # / Visits authorized: 4/20   FOTO: 4/5     Precautions: Standard      Time In: 3:05 pm  Time Out: 4:00 pm  Total Billable Time: 55 minutes    PTA Visit #: 1/5       Subjective     Patient reports: he has been doing well since previous session. Started performing soft tissue mobilizations to lateral gastroc head with good relief. Felt a quick episode of instability, but resolved.    He was compliant with home exercise program.  Response to previous treatment: no adverse effects  Functional change: Ongoing    Pain: 0/10  Location: right knee     Objective      Objective Measures updated at progress report unless specified.     See EMR under MEDIA for written consent provided.    Palpation Assessment to determine the necessity for Functional Dry Needling to right gastroc-soleus.     Treatment     KINGA received the treatments listed below:      therapeutic exercises to develop strength, endurance, ROM, flexibility, posture, and core stabilization for 35 minutes including: Additional time supervised    Cybex knee extensions: 35# single leg - 3x10  Cybex single leg press: seat 5 - 8 plates 3x10  Swiss ball hamstring curls: 3x10  [place] split squat: 2x10 right   Lateral band walks: blue - 45 feet 3 laps  Monster walks: blue - 45 feet 3 laps   NEXT: hamstring curl single leg     manual therapy  "techniques: Joint mobilizations, Manual traction, Myofacial release, Soft tissue Mobilization, and Friction Massage were applied to the: right knee  for 20 minutes, including:    Kinga was agreeable to dry needling by a certified dry needling PT and signed a consent prior to treatment after being made aware of risks. 40mm needle was inserted into the medial & lateral gastroc head and VMO. Periosteal pecking, winding for grasp, and skin sliding were performed. Afterwards, needle was removed and placed into a sharps container. Patient demonstrated appropriate response to Functional Dry Needling. good muscle twitch response observed to treated muscle groups.     therapeutic activities to improve functional performance for 15 minutes, including:  Goblet squat: 30# - 3x10  Barbell squats: 20# 2x10  Alternating lunges: 8# - 2x10       Patient Education and Home Exercises       Education provided:   - home exercise program review  - dry needling purpose, risk factors, and goals     Written Home Exercises Provided: Patient instructed to cont prior HEP. Exercises were reviewed and KINGA was able to demonstrate them prior to the end of the session.  KINGA demonstrated good  understanding of the education provided. See Electronic Medical Record under Patient Instructions for exercises provided during therapy sessions    Assessment     Kinga is a 33 y.o. male referred to outpatient Physical Therapy with a medical diagnosis of M25.361 (ICD-10-CM) - Patellar instability of right knee. Patient responding well to therapy thus far. Great response to functional dry needling to lateral head of gastroc. Included needling to medial gastroc head and VMO this session in attempts to resolve low grade "instability" feelings.  good  muscle twitch contractions observed to treated muscle groups. Followed with intense quadriceps, hamstring, and posterolateral hip strengthening.     KINGA Is progressing well towards his goals. "   Patient prognosis is Excellent.     Patient will continue to benefit from skilled outpatient physical therapy to address the deficits listed in the problem list box on initial evaluation, provide pt/family education and to maximize pt's level of independence in the home and community environment.     Patient's spiritual, cultural and educational needs considered and pt agreeable to plan of care and goals.     Anticipated barriers to physical therapy: chronicity     Goals:   Short Term Goals (3 Weeks):  1. Patient will be compliant with home exercise program to supplement therapy in restoring pain free function.  2. Patient will improve impaired lower extremity Microfet Testing by  >/= 5 kg to improve dynamic hip/knee support for functional tasks.  3. Patient will improve right knee flexion active range of motion to 130 degrees to improve functional mobility.      Long Term Goals (6 Weeks):  1. Patient will improve FOTO score to </= 24% limited to decrease perceived limitation with mobility.   2. Patient will improve impaired quadriceps and hip abduction MicroFet Testing by >/= 10 kg to improve dynamic hip/knee support for functional tasks.  3. Patient will be able to deep squat with 50 lb barbell to return to heavy loading activities.   4. Patient will be able to perform 35 lb goblet squat to return to squatting activities at gym.  5. Patient will be able to return to prior exercise routine to return to prior level of function.  Plan     Continue PT plan of care     Mehreen Covarrubias, PT

## 2023-10-19 ENCOUNTER — CLINICAL SUPPORT (OUTPATIENT)
Dept: REHABILITATION | Facility: HOSPITAL | Age: 33
End: 2023-10-19
Payer: COMMERCIAL

## 2023-10-19 DIAGNOSIS — M62.81 QUADRICEPS WEAKNESS: Primary | ICD-10-CM

## 2023-10-19 DIAGNOSIS — M25.661 DECREASED RANGE OF MOTION (ROM) OF RIGHT KNEE: ICD-10-CM

## 2023-10-19 DIAGNOSIS — R29.898 WEAKNESS OF BOTH HIPS: ICD-10-CM

## 2023-10-19 PROCEDURE — 97530 THERAPEUTIC ACTIVITIES: CPT | Mod: PN

## 2023-10-19 PROCEDURE — 97112 NEUROMUSCULAR REEDUCATION: CPT | Mod: PN

## 2023-10-19 PROCEDURE — 97110 THERAPEUTIC EXERCISES: CPT | Mod: PN

## 2023-10-19 NOTE — PROGRESS NOTES
"[facility]   Physical Therapy Treatment Note      Name: Kinga Gerardo Rutherford  Clinic Number: [id number]    Therapy Diagnosis:   Encounter Diagnoses   Name Primary?    Quadriceps weakness Yes    Weakness of both hips     Decreased range of motion (ROM) of right knee      Physician: Nikko Stanley MD    Visit Date: 10/19/2023     Physician Orders: PT Eval and Treat   Medical Diagnosis from Referral: M25.361 (ICD-10-CM) - Patellar instability of right knee  Evaluation Date: 2023  Authorization Period Expiration: 2024  Plan of Care Expiration: 10/26/2023  Progress Note Due: 10/3/2023  Visit # / Visits authorized:    FOTO: next at discharge     Precautions: Standard      Time In: 3:05 pm  Time Out: 4:00 pm  Total Billable Time: 55 minutes    PTA Visit #:        Subjective     Patient reports: Just some posterior and global knee tightness, but no pain. Just some pressure. Improvements by 55-60% since beginning therapy. Threshold to instigate pain is not as low.    He was compliant with home exercise program.  Response to previous treatment: soreness  Functional change: less tightness, instability, and episodes of irritability    Pain: 0/10  Location: right knee     Objective      Objective Measures updated at progress report unless specified.     See EMR under MEDIA for written consent provided.    Palpation Assessment to determine the necessity for Functional Dry Needling to right gastroc-soleus.     Treatment     KINGA received the treatments listed below:      therapeutic exercises to develop strength, endurance, ROM, flexibility, posture, and core stabilization for 30 minutes including: Additional time supervised    Gastroc fitter stretch: 3x30"  Soleus fitter stretch: 3x30"  Standing hamstring stretch on table: 3x30"  Lateral band walks: blue - 45 feet 3 laps  Monster walks: blue - 45 feet 3 laps   Cybex hamstring curl single le plates - 3x10 right   Cybex knee " "extensions: 50# single leg - 3x10 right   Cybex single leg press: seat 5 - 14 plates 3x10 (driving through heels for glute activation)  Swiss ball hamstring curls: 3x10    therapeutic activities to improve functional performance for 15 minutes, including:  Goblet squat: 35# - 3x10  Dead lifts: 50# barbell - 3x10   Split squats: 10# dumbbells - 3x10    neuromuscular re-education activities to improve: Balance, Kinesthetic, Proprioception, and Posture for 10 minutes. The following activities were included:  Single leg forward heel tap: 6" step - 2x10  Single leg squat: 18" step - 20x right     manual therapy techniques: Joint mobilizations, Manual traction, Myofacial release, Soft tissue Mobilization, and Friction Massage were applied to the: right knee for 00 minutes, including:  Kinga was agreeable to dry needling by a certified dry needling PT and signed a consent prior to treatment after being made aware of risks. 40mm needle was inserted into the medial & lateral gastroc head and VMO. Periosteal pecking, winding for grasp, and skin sliding were performed. Afterwards, needle was removed and placed into a sharps container. Patient demonstrated appropriate response to Functional Dry Needling. good muscle twitch response observed to treated muscle groups.         Patient Education and Home Exercises       Education provided:   - home exercise program review  - dry needling purpose, risk factors, and goals     Written Home Exercises Provided: Patient instructed to cont prior HEP. Exercises were reviewed and KINGA was able to demonstrate them prior to the end of the session.  KINGA demonstrated good  understanding of the education provided. See Electronic Medical Record under Patient Instructions for exercises provided during therapy sessions    Assessment     Kinga is a 33 y.o. male referred to outpatient Physical Therapy with a medical diagnosis of M25.361 (ICD-10-CM) - Patellar instability of right knee. " Patient presents with some posterior knee tightness. Added hamstring stretch and gastric-soleus stretches to promote posterior knee capsule stretch. Progressed intensity of exercise regimen by increasing load with deep squatting and progressing difficulty of closed chain eccentric quad control. Valgus collapse noted with single leg squats that improved with verbal cueing. Patient has one visit remaining, potential discharge next visit.    KINGA Is progressing well towards his goals.   Patient prognosis is Excellent.     Patient will continue to benefit from skilled outpatient physical therapy to address the deficits listed in the problem list box on initial evaluation, provide pt/family education and to maximize pt's level of independence in the home and community environment.     Patient's spiritual, cultural and educational needs considered and pt agreeable to plan of care and goals.     Anticipated barriers to physical therapy: chronicity     Goals:   Short Term Goals (3 Weeks):  1. Patient will be compliant with home exercise program to supplement therapy in restoring pain free function.  2. Patient will improve impaired lower extremity Microfet Testing by  >/= 5 kg to improve dynamic hip/knee support for functional tasks.  3. Patient will improve right knee flexion active range of motion to 130 degrees to improve functional mobility.      Long Term Goals (6 Weeks):  1. Patient will improve FOTO score to </= 24% limited to decrease perceived limitation with mobility.   2. Patient will improve impaired quadriceps and hip abduction MicroFet Testing by >/= 10 kg to improve dynamic hip/knee support for functional tasks.  3. Patient will be able to deep squat with 50 lb barbell to return to heavy loading activities. MET 10/19/2023  4. Patient will be able to perform 35 lb goblet squat to return to squatting activities at gym. MET 10/19/2023  5. Patient will be able to return to prior exercise routine to return to  prior level of function.    Plan     Continue PT plan of care     Mehreen Covarrubias, PT

## 2023-10-26 ENCOUNTER — CLINICAL SUPPORT (OUTPATIENT)
Dept: REHABILITATION | Facility: HOSPITAL | Age: 33
End: 2023-10-26
Payer: COMMERCIAL

## 2023-10-26 DIAGNOSIS — M62.81 QUADRICEPS WEAKNESS: Primary | ICD-10-CM

## 2023-10-26 DIAGNOSIS — R29.898 WEAKNESS OF BOTH HIPS: ICD-10-CM

## 2023-10-26 DIAGNOSIS — M25.661 DECREASED RANGE OF MOTION (ROM) OF RIGHT KNEE: ICD-10-CM

## 2023-10-26 PROCEDURE — 97530 THERAPEUTIC ACTIVITIES: CPT | Mod: PN

## 2023-10-26 NOTE — PROGRESS NOTES
[facility]   Physical Therapy Treatment Note / Discharge      Name: Kinga Toscano  Clinic Number: [id number]    Therapy Diagnosis:   Encounter Diagnoses   Name Primary?    Quadriceps weakness Yes    Weakness of both hips     Decreased range of motion (ROM) of right knee      Physician: Nikko Stanley MD    Visit Date: 10/26/2023     Physician Orders: PT Eval and Treat   Medical Diagnosis from Referral: M25.361 (ICD-10-CM) - Patellar instability of right knee  Evaluation Date: 9/12/2023  Authorization Period Expiration: 9/12/2024  Plan of Care Expiration: 10/26/2023  Progress Note Due: 10/3/2023  Visit # / Visits authorized: 6/20   FOTO: completed      Precautions: Standard      Time In: 3:05 pm  Time Out: 3:40 pm  Total Billable Time: 35 minutes    PTA Visit #: 1/5       Subjective     Patient reports: improved right knee pain. Just some continued tightness.     He was compliant with home exercise program.  Response to previous treatment: soreness  Functional change: less tightness, instability, and episodes of irritability    Pain: 0/10  Location: right knee     Objective      10/26/2023  Range of Motion:   Right: -3-0-125 degrees -- -7-0-130 degrees   Left: -7-0-132 degrees         Lower Extremity Strength                 LE           Right           Left   Hip flexion: 18.8 -- 19.0 kg 18.6  -- 18.8 kg   Hip abduction 16.7 kg 20.3 kg   Knee flexion 17.3 -- 21.4 kg 16.5 -- 21.1 kg   Knee extension 49.7 kg 43.6 -- 49.9kg       Treatment     KINGA received the treatments listed below:    Therapeutic activities to improve functional performance for 35 minutes, including:  FOTO  Objective tests and measures  Outcome measures  Home exercise program overview   Questions and answers   Discharge         Patient Education and Home Exercises       Education provided:   - home exercise program review  - dry needling purpose, risk factors, and goals     Written Home Exercises Provided: Patient  instructed to cont prior HEP. Exercises were reviewed and KINGA was able to demonstrate them prior to the end of the session.  KINGA demonstrated good  understanding of the education provided. See Electronic Medical Record under Patient Instructions for exercises provided during therapy sessions    Assessment     Kinga is a 33 y.o. male referred to outpatient Physical Therapy with a medical diagnosis of M25.361 (ICD-10-CM) - Patellar instability of right knee. Patient ready for discharge with only complaints of slight posterior knee capsule tightness. Instructed to continue HEP - including static stretches, closed chain strengthening, and single lower extremity hip/knee stabilization. Also, instructed to continue soft tissue mobilizations to gastroc-solues complex and slowly progress weighted squatting. Provided updated HEP. Patient discharged at this time.       KINGA Is progressing well towards his goals.   Patient prognosis is Excellent.     Patient will continue to benefit from skilled outpatient physical therapy to address the deficits listed in the problem list box on initial evaluation, provide pt/family education and to maximize pt's level of independence in the home and community environment.     Patient's spiritual, cultural and educational needs considered and pt agreeable to plan of care and goals.     Anticipated barriers to physical therapy: chronicity     Goals:   Short Term Goals (3 Weeks):  1. Patient will be compliant with home exercise program to supplement therapy in restoring pain free function. MET 10/26/2023  2. Patient will improve impaired lower extremity Microfet Testing by  >/= 5 kg to improve dynamic hip/knee support for functional tasks. MET 10/26/2023  3. Patient will improve right knee flexion active range of motion to 130 degrees to improve functional mobility. MET 10/26/2023     Long Term Goals (6 Weeks):  1. Patient will improve FOTO score to </= 24% limited to decrease  perceived limitation with mobility. MET 10/26/2023  2. Patient will improve impaired quadriceps and hip abduction MicroFet Testing by >/= 10 kg to improve dynamic hip/knee support for functional tasks. Partially MET 10/26/2023  3. Patient will be able to deep squat with 50 lb barbell to return to heavy loading activities. MET 10/19/2023  4. Patient will be able to perform 35 lb goblet squat to return to squatting activities at gym. MET 10/19/2023  5. Patient will be able to return to prior exercise routine to return to prior level of function. Partially MET 10/26/2023    Plan     Discharge with slow progressions back to prior exercise routine.     Mehreen Covarrubias, PT

## 2023-10-28 NOTE — TELEPHONE ENCOUNTER
----- Message from Jade Yates sent at 8/2/2023  9:30 AM CDT -----  Type: RX Refill Request    Who Called: Self    Have you contacted your pharmacy:No    Refill or New Rx:Refill    RX Name and Strength:albuterol (PROVENTIL HFA) 90 mcg/actuation inhaler        Preferred Pharmacy with phone number:[facility] [address] [place] [address]   Phone:  [phone]  Fax:  [phone]          Local or Mail Order:Local    Would the patient rather a call back or a response via [facility]? Call    Best Call Back Number:.[phone] (home)     Additional Information: pt is requesting space chamber         
Spoke to pt virtual appt scheduled today for refills.  
The scribe's documentation has been prepared under my direction and personally reviewed by me in its entirety. I confirm that the note above accurately reflects all work, treatment, procedures, and medical decision making performed by me.

## 2023-11-14 ENCOUNTER — TELEPHONE (OUTPATIENT)
Dept: ORTHOPEDICS | Facility: CLINIC | Age: 33
End: 2023-11-14
Payer: COMMERCIAL

## 2023-11-14 DIAGNOSIS — M25.361 PATELLAR INSTABILITY OF RIGHT KNEE: Primary | ICD-10-CM

## 2023-11-14 NOTE — TELEPHONE ENCOUNTER
----- Message from Muna Short sent at 11/14/2023  2:55 PM CST -----  Type:  Call back     Who Called:pt     Does the patient know what this is regarding?:MRI   Would the patient rather a call back or a response via [facility]? Call   Best Call Back Number: [phone]  Additional Information:     Pt stated he is done with PT and would like to get his MRI done

## 2023-11-21 ENCOUNTER — OFFICE VISIT (OUTPATIENT)
Dept: ORTHOPEDICS | Facility: CLINIC | Age: 33
End: 2023-11-21
Payer: COMMERCIAL

## 2023-11-21 VITALS
HEART RATE: 67 BPM | SYSTOLIC BLOOD PRESSURE: 135 MMHG | HEIGHT: 72 IN | WEIGHT: 227.5 LBS | BODY MASS INDEX: 30.81 KG/M2 | DIASTOLIC BLOOD PRESSURE: 80 MMHG

## 2023-11-21 DIAGNOSIS — M25.561 CHRONIC PAIN OF RIGHT KNEE: Primary | ICD-10-CM

## 2023-11-21 DIAGNOSIS — G89.29 CHRONIC PAIN OF RIGHT KNEE: Primary | ICD-10-CM

## 2023-11-21 DIAGNOSIS — M25.361 PATELLAR INSTABILITY OF RIGHT KNEE: ICD-10-CM

## 2023-11-21 PROCEDURE — 99499 UNLISTED E&M SERVICE: CPT | Mod: S$GLB,,, | Performed by: ORTHOPAEDIC SURGERY

## 2023-11-21 PROCEDURE — 99499 NO LOS: ICD-10-PCS | Mod: S$GLB,,, | Performed by: ORTHOPAEDIC SURGERY

## 2023-12-02 ENCOUNTER — HOSPITAL ENCOUNTER (OUTPATIENT)
Dept: RADIOLOGY | Facility: HOSPITAL | Age: 33
Discharge: HOME | End: 2023-12-02
Attending: ORTHOPAEDIC SURGERY
Payer: COMMERCIAL

## 2023-12-02 DIAGNOSIS — M25.361 PATELLAR INSTABILITY OF RIGHT KNEE: ICD-10-CM

## 2023-12-02 PROCEDURE — 73721 MRI JNT OF LWR EXTRE W/O DYE: CPT | Mod: TC,RT

## 2023-12-02 PROCEDURE — 73721 MRI KNEE WITHOUT CONTRAST RIGHT: ICD-10-PCS | Mod: 26,RT,, | Performed by: RADIOLOGY

## 2023-12-02 PROCEDURE — 73721 MRI JNT OF LWR EXTRE W/O DYE: CPT | Mod: 26,RT,, | Performed by: RADIOLOGY

## 2023-12-05 ENCOUNTER — OFFICE VISIT (OUTPATIENT)
Dept: ORTHOPEDICS | Facility: CLINIC | Age: 33
End: 2023-12-05
Payer: COMMERCIAL

## 2023-12-05 VITALS
SYSTOLIC BLOOD PRESSURE: 144 MMHG | BODY MASS INDEX: 32.01 KG/M2 | WEIGHT: 236.31 LBS | HEIGHT: 72 IN | HEART RATE: 71 BPM | DIASTOLIC BLOOD PRESSURE: 87 MMHG

## 2023-12-05 DIAGNOSIS — M94.261 CHONDROMALACIA OF RIGHT KNEE: Primary | ICD-10-CM

## 2023-12-05 PROCEDURE — 1159F PR MEDICATION LIST DOCUMENTED IN MEDICAL RECORD: ICD-10-PCS | Mod: CPTII,S$GLB,, | Performed by: ORTHOPAEDIC SURGERY

## 2023-12-05 PROCEDURE — 99999 PR PBB SHADOW E&M-EST. PATIENT-LVL III: ICD-10-PCS | Mod: PBBFAC,,, | Performed by: ORTHOPAEDIC SURGERY

## 2023-12-05 PROCEDURE — 3044F PR MOST RECENT HEMOGLOBIN A1C LEVEL <7.0%: ICD-10-PCS | Mod: CPTII,S$GLB,, | Performed by: ORTHOPAEDIC SURGERY

## 2023-12-05 PROCEDURE — 3008F PR BODY MASS INDEX (BMI) DOCUMENTED: ICD-10-PCS | Mod: CPTII,S$GLB,, | Performed by: ORTHOPAEDIC SURGERY

## 2023-12-05 PROCEDURE — 99214 OFFICE O/P EST MOD 30 MIN: CPT | Mod: S$GLB,,, | Performed by: ORTHOPAEDIC SURGERY

## 2023-12-05 PROCEDURE — 1160F RVW MEDS BY RX/DR IN RCRD: CPT | Mod: CPTII,S$GLB,, | Performed by: ORTHOPAEDIC SURGERY

## 2023-12-05 PROCEDURE — 3079F PR MOST RECENT DIASTOLIC BLOOD PRESSURE 80-89 MM HG: ICD-10-PCS | Mod: CPTII,S$GLB,, | Performed by: ORTHOPAEDIC SURGERY

## 2023-12-05 PROCEDURE — 3077F PR MOST RECENT SYSTOLIC BLOOD PRESSURE >= 140 MM HG: ICD-10-PCS | Mod: CPTII,S$GLB,, | Performed by: ORTHOPAEDIC SURGERY

## 2023-12-05 PROCEDURE — 3079F DIAST BP 80-89 MM HG: CPT | Mod: CPTII,S$GLB,, | Performed by: ORTHOPAEDIC SURGERY

## 2023-12-05 PROCEDURE — 99999 PR PBB SHADOW E&M-EST. PATIENT-LVL III: CPT | Mod: PBBFAC,,, | Performed by: ORTHOPAEDIC SURGERY

## 2023-12-05 PROCEDURE — 3077F SYST BP >= 140 MM HG: CPT | Mod: CPTII,S$GLB,, | Performed by: ORTHOPAEDIC SURGERY

## 2023-12-05 PROCEDURE — 3044F HG A1C LEVEL LT 7.0%: CPT | Mod: CPTII,S$GLB,, | Performed by: ORTHOPAEDIC SURGERY

## 2023-12-05 PROCEDURE — 1159F MED LIST DOCD IN RCRD: CPT | Mod: CPTII,S$GLB,, | Performed by: ORTHOPAEDIC SURGERY

## 2023-12-05 PROCEDURE — 99214 PR OFFICE/OUTPT VISIT, EST, LEVL IV, 30-39 MIN: ICD-10-PCS | Mod: S$GLB,,, | Performed by: ORTHOPAEDIC SURGERY

## 2023-12-05 PROCEDURE — 3008F BODY MASS INDEX DOCD: CPT | Mod: CPTII,S$GLB,, | Performed by: ORTHOPAEDIC SURGERY

## 2023-12-05 PROCEDURE — 1160F PR REVIEW ALL MEDS BY PRESCRIBER/CLIN PHARMACIST DOCUMENTED: ICD-10-PCS | Mod: CPTII,S$GLB,, | Performed by: ORTHOPAEDIC SURGERY

## 2023-12-06 NOTE — PROGRESS NOTES
Patient ID:   Ze Toscano is a 33 y.o. male.    Chief Complaint:   Right knee pain    HPI:   Mr. Toscano is a 33 year old male who reports onset of right knee pain in May 2022. He states that he was doing a dead lift and felt a sharp pain over the anterior aspect of the knee. Since the onset, he has continued to experience pain with activity. He currently describes pain over the anterior knee and a fullness in the back of the knee. He reports a catching feeling. He denies any giving way or locking.     Past treatment has included the following: physical therapy, activity modification, NSAIDS, and knee sleeve bracing.     Medications:    Current Outpatient Medications:     albuterol (PROVENTIL HFA) 90 mcg/actuation inhaler, Inhale 2 puffs into the lungs every 6 (six) hours as needed for Wheezing. Rescue, Disp: 18 g, Rfl: 11    budesonide-formoterol 80-4.5 mcg (SYMBICORT) 80-4.5 mcg/actuation HFAA, Inhale 2 puffs into the lungs Daily. Controller, Disp: 10 g, Rfl: 3    ketoconazole (NIZORAL) 2 % shampoo, Apply topically twice a week., Disp: 120 mL, Rfl: 2    meloxicam (MOBIC) 7.5 MG tablet, Take 1 tablet (7.5 mg total) by mouth once daily., Disp: 30 tablet, Rfl: 1    ibuprofen (ADVIL,MOTRIN) 600 MG tablet, Take 600 mg by mouth 3 (three) times daily as needed., Disp: , Rfl:     Allergies:  Review of patient's allergies indicates:   Allergen Reactions    House dust     Latex, natural rubber Hives       Past Medical History:  Past Medical History:   Diagnosis Date    Asthma     Right knee pain         Past Surgical History:  Past Surgical History:   Procedure Laterality Date    APPENDECTOMY  2005    ESOPHAGOGASTRODUODENOSCOPY N/A 08/29/2023    Procedure: EGD (ESOPHAGOGASTRODUODENOSCOPY);  Surgeon: Glen Parada MD;  Location: [facility];  Service: Endoscopy;  Laterality: N/A;    WISDOM TOOTH EXTRACTION         Social History:  Social History     Occupational History    Not on file   Tobacco Use     Smoking status: Never    Smokeless tobacco: Never   Substance and Sexual Activity    Alcohol use: Yes     Alcohol/week: 10.0 standard drinks of alcohol     Types: 10 Glasses of wine per week     Comment: social drinker    Drug use: Never    Sexual activity: Yes     Partners: Female       Family History:  Family History   Problem Relation Age of Onset    Gallbladder disease Mother     Diabetes Father     Mitral valve prolapse Father     Neuropathy Father     Emphysema Maternal Grandmother     Emphysema Maternal Grandfather         ROS:  Review of Systems   Musculoskeletal:  Positive for joint pain and myalgias.   Neurological:  Negative for numbness and paresthesias.   All other systems reviewed and are negative.      Vitals:  BP (!) 144/87   Pulse 71   Ht 6' (1.829 m)   Wt 107.2 kg (236 lb 5.3 oz)   BMI 32.05 kg/m²     Physical Examination:  Comprehensive Orthopaedic Musculoskeletal Exam    General      Constitutional: appears stated age, mildly obese, well-developed and well-nourished    Scleral icterus: no    Labored breathing: no    Psychiatric: normal mood and affect and no acute distress    Neurological: alert and oriented x3    Skin: intact    Lymphadenopathy: none     Ortho Exam   Right knee exam:  No effusion.   Minimal tenderness along the medial and lateral joint line. Minimal tenderness along the medial and lateral patellar facets.   ROM full 0-140.   Quadriceps intact.  Patellar tilt to neutral. Patellar grind negative.   Stable ligament exam to varus and valgus. Lachmans 1A. Negative posterior drawer.     Imaging:  I have independently reviewed the following imaging studies performed at [facility]:    EXAMINATION:  XR KNEE COMP 4 OR MORE VIEWS RIGHT     CLINICAL HISTORY:  Pain in right knee     TECHNIQUE:  Four views     COMPARISON:  None     FINDINGS:  The alignment is within normal limits.  No fracture.  No marrow replacement process.     Impression:     No acute findings.        Electronically  signed by: Chidi Geiger MD  Date:                                            11/29/2022  Time:                                           10:02    EXAMINATION:  MRI KNEE WITHOUT CONTRAST RIGHT     CLINICAL HISTORY:  Knee pain, chronic, positive xray (Age >= 5y);     TECHNIQUE:  Routine MRI evaluation of the right knee without contrast.     COMPARISON:  Radiographs 11/29/2022.     FINDINGS:  Menisci: Medial and lateral menisci are intact.  Anterior and posterior root ligaments are normal.     Ligaments: ACL, PCL, MCL and posterior-lateral corner structures are normal.     Extensor Mechanism: Patellofemoral alignment is maintained.  Quadriceps and patellar tendons are intact.  MPFL and medial/lateral retinacula are normal.     Cartilage:     *Patellofemoral: Intact without partial or full-thickness defects.  No subchondral edema.  *Medial tibiofemoral: Chondral signal heterogeneity and suspected superficial fissuring at the lateral aspect of the central/posterior weight-bearing femoral condyle.  Tibial cartilage is preserved  *Lateral tibiofemoral: Focal area of full-thickness chondral fissuring with subchondral edema at the far posterior weight-bearing femoral condyle.  Tibial cartilage is preserved.  Bones: No fractures.  No avascular necrosis.  No marrow infiltrative process.     Miscellaneous: Small volume of intra-articular fluid.  No popliteal cyst.  Medial gastrocnemius, lateral gastrocnemius, distal semimembranosus and visualized pes anserine tendons are intact.  Distal iliotibial band is normal.  Visualized neurovascular structures demonstrate no significant abnormalities.     Impression:     1. Focal area of full-thickness chondral fissuring with subchondral edema at the far posterior weight-bearing lateral femoral condyle.  2. Chondral signal heterogeneity and suspected superficial fissuring at the lateral aspect of the central/posterior weight-bearing medial femoral condyle.  No subchondral edema.         Electronically signed by: Med Underwood MD  Date:                                            12/02/2023  Time:                                           15:32    Assessment:  1. Chondromalacia of right knee      Plan:  I have reviewed the clinical and radiographic findings with Mr. Toscano. He has now had pain in the right knee for 1.5 years without resolution. The MRI shows chondral fissuring. I discussed management options both non-operative and operative. I discussed viscosupplement injections and more physical therapy. On the surgical side, a discussed diagnostic knee arthroscopy to better determine the extent of the cartilage issue and potentially treat the lesion. I explained that there may not be a good surgical solution if there is only a cartilage fissure.     It seems like he would like to proceed with diagnostic arthroscopy. He will contact the office at a later date if he wishes to proceed.     No follow-ups on file.

## 2024-02-19 ENCOUNTER — PATIENT OUTREACH (OUTPATIENT)
Dept: ADMINISTRATIVE | Facility: HOSPITAL | Age: 34
End: 2024-02-19
Payer: COMMERCIAL

## 2024-02-19 NOTE — PROGRESS NOTES
Updates were requested from care everywhere.  Health Maintenance has been updated.  [place] immunization registry triggered.  Immunizations were reconciled.  PCP updated.

## 2024-06-05 NOTE — PROGRESS NOTES
Subjective:       Patient ID: Ze Toscano is a 33 y.o. male.    Chief Complaint: Lump on testicle  (Right)     This is a 33 y.o.  male patient that is new to me.  The patient was self referred  for lump on testicle. Reports that he has had a lump on his right testicle in 2021, went away in a couple days. Reports that the lump came back on his right testicle a couple months ago, he feels that the lump has gotten bigger and is now causing him pain. Reports that the pain is  dull, intermittent and hurts worse when it is palpated. Reports the lump is located on top of his right testicle. Denies LUTS, hematuria, dysuria,  penile pain, penile discharge, fevers, chills. Denies concern for STDs. Patient unable to void in clinic today.      LAST PSA  Lab Results   Component Value Date    PSA 0.25 09/01/2023       Lab Results   Component Value Date    CREATININE 1.2 09/01/2023       ---  PMH/PSH/Medications/Allergies/Social history reviewed and as in chart.    Review of Systems   Constitutional:  Negative for activity change, chills and fever.   Respiratory:  Negative for shortness of breath.    Cardiovascular:  Negative for chest pain and palpitations.   Gastrointestinal:  Negative for abdominal pain and constipation.   Genitourinary:  Positive for testicular pain. Negative for difficulty urinating, dysuria, flank pain, frequency, hematuria, penile pain, penile swelling, scrotal swelling and urgency.   Neurological:  Negative for dizziness and light-headedness.       Objective:      Physical Exam  HENT:      Head: Normocephalic.   Pulmonary:      Effort: Pulmonary effort is normal.   Genitourinary:     Penis: Normal.       Testes:         Right: Tenderness present. Mass or swelling not present.         Left: Mass, tenderness or swelling not present.      Comments: Tenderness when palpating right testicle, no swelling or redness to right testicle, no lesions or masses palpated  Musculoskeletal:         General: Normal  range of motion.      Cervical back: Normal range of motion.   Skin:     General: Skin is warm and dry.   Neurological:      Mental Status: He is alert and oriented to person, place, and time.       Assessment:     Problem Noted   Right Testicular Pain 6/6/2024       Plan:     Discussed the possibilities of testicular pain: epididymitis vs pelvic floor dysfunction, vs vericocele. Will start a course of doxycycline 100mg BID for 7 days  Scrotal US ordered.  Conservative/behavioral management for testicular pain: NSAIDs (Ibuprofen 400mg three times daily as needed), supportive underwear, warm/cold compresses, keep scrotum elevated.  Follow-up 1 month    TWAN Tipton spent a total of 25 minutes on the day of the visit.This includes face to face time and non-face to face time preparing to see the patient (eg, review of tests), obtaining and/or reviewing separately obtained history, documenting clinical information in the electronic or other health record, independently interpreting results and communicating results to the patient/family/caregiver, or care coordinator.

## 2024-06-06 ENCOUNTER — PATIENT MESSAGE (OUTPATIENT)
Dept: UROLOGY | Facility: CLINIC | Age: 34
End: 2024-06-06

## 2024-06-06 ENCOUNTER — HOSPITAL ENCOUNTER (OUTPATIENT)
Dept: RADIOLOGY | Facility: HOSPITAL | Age: 34
Discharge: HOME | End: 2024-06-06
Payer: COMMERCIAL

## 2024-06-06 ENCOUNTER — OFFICE VISIT (OUTPATIENT)
Dept: UROLOGY | Facility: CLINIC | Age: 34
End: 2024-06-06
Payer: COMMERCIAL

## 2024-06-06 VITALS
WEIGHT: 238.88 LBS | BODY MASS INDEX: 32.35 KG/M2 | HEIGHT: 72 IN | DIASTOLIC BLOOD PRESSURE: 89 MMHG | HEART RATE: 79 BPM | SYSTOLIC BLOOD PRESSURE: 142 MMHG

## 2024-06-06 DIAGNOSIS — N50.811 RIGHT TESTICULAR PAIN: ICD-10-CM

## 2024-06-06 DIAGNOSIS — N50.811 RIGHT TESTICULAR PAIN: Primary | ICD-10-CM

## 2024-06-06 PROCEDURE — 99203 OFFICE O/P NEW LOW 30 MIN: CPT | Mod: S$GLB,,,

## 2024-06-06 PROCEDURE — 76870 US EXAM SCROTUM: CPT | Mod: TC

## 2024-06-06 PROCEDURE — 99999 PR PBB SHADOW E&M-EST. PATIENT-LVL III: CPT | Mod: PBBFAC,,,

## 2024-06-06 PROCEDURE — 1159F MED LIST DOCD IN RCRD: CPT | Mod: CPTII,S$GLB,,

## 2024-06-06 PROCEDURE — 3008F BODY MASS INDEX DOCD: CPT | Mod: CPTII,S$GLB,,

## 2024-06-06 PROCEDURE — 3079F DIAST BP 80-89 MM HG: CPT | Mod: CPTII,S$GLB,,

## 2024-06-06 PROCEDURE — 76870 US EXAM SCROTUM: CPT | Mod: 26,,, | Performed by: RADIOLOGY

## 2024-06-06 PROCEDURE — 3077F SYST BP >= 140 MM HG: CPT | Mod: CPTII,S$GLB,,

## 2024-06-06 PROCEDURE — 1160F RVW MEDS BY RX/DR IN RCRD: CPT | Mod: CPTII,S$GLB,,

## 2024-06-06 RX ORDER — DOXYCYCLINE 100 MG/1
100 CAPSULE ORAL 2 TIMES DAILY
Qty: 14 CAPSULE | Refills: 0 | Status: SHIPPED | OUTPATIENT
Start: 2024-06-06 | End: 2024-06-13

## 2024-06-06 NOTE — PATIENT INSTRUCTIONS
Conservative/behavioral management for testicular pain: NSAIDs (Ibuprofen 400mg three times daily as needed), supportive underwear, warm/cold compresses, elevate testicle.  Take doxycycline two times per day

## 2024-06-21 ENCOUNTER — HOSPITAL ENCOUNTER (OUTPATIENT)
Dept: RADIOLOGY | Facility: HOSPITAL | Age: 34
Discharge: HOME | End: 2024-06-21
Attending: ORTHOPAEDIC SURGERY
Payer: COMMERCIAL

## 2024-06-21 ENCOUNTER — OFFICE VISIT (OUTPATIENT)
Dept: ORTHOPEDICS | Facility: CLINIC | Age: 34
End: 2024-06-21
Payer: COMMERCIAL

## 2024-06-21 VITALS — HEIGHT: 72 IN | BODY MASS INDEX: 32.34 KG/M2 | WEIGHT: 238.75 LBS

## 2024-06-21 DIAGNOSIS — G89.29 CHRONIC PAIN OF RIGHT KNEE: ICD-10-CM

## 2024-06-21 DIAGNOSIS — M94.261 CHONDROMALACIA OF RIGHT KNEE: Primary | ICD-10-CM

## 2024-06-21 DIAGNOSIS — M25.361 PATELLAR INSTABILITY OF RIGHT KNEE: ICD-10-CM

## 2024-06-21 DIAGNOSIS — M25.561 CHRONIC PAIN OF RIGHT KNEE: ICD-10-CM

## 2024-06-21 PROCEDURE — 99214 OFFICE O/P EST MOD 30 MIN: CPT | Mod: S$GLB,,, | Performed by: ORTHOPAEDIC SURGERY

## 2024-06-21 PROCEDURE — 1159F MED LIST DOCD IN RCRD: CPT | Mod: CPTII,S$GLB,, | Performed by: ORTHOPAEDIC SURGERY

## 2024-06-21 PROCEDURE — 99999 PR PBB SHADOW E&M-EST. PATIENT-LVL IV: CPT | Mod: PBBFAC,,, | Performed by: ORTHOPAEDIC SURGERY

## 2024-06-21 PROCEDURE — 3008F BODY MASS INDEX DOCD: CPT | Mod: CPTII,S$GLB,, | Performed by: ORTHOPAEDIC SURGERY

## 2024-06-21 PROCEDURE — 1160F RVW MEDS BY RX/DR IN RCRD: CPT | Mod: CPTII,S$GLB,, | Performed by: ORTHOPAEDIC SURGERY

## 2024-06-21 RX ORDER — CEFAZOLIN SODIUM 2 G/50ML
2 SOLUTION INTRAVENOUS
OUTPATIENT
Start: 2024-06-21

## 2024-06-21 RX ORDER — SODIUM CHLORIDE 9 MG/ML
INJECTION, SOLUTION INTRAVENOUS CONTINUOUS
OUTPATIENT
Start: 2024-06-21

## 2024-06-21 NOTE — PROGRESS NOTES
Patient ID:   Ze Toscano is a 33 y.o. male.    Chief Complaint:   Follow-up evaluation for right knee chondromalacia    HPI:   Interval history:   Patient is returning today for evaluation of his right knee.  He continues to have pain in the right knee especially when he is working out.  Previous MRI scan showed multiple areas of chondromalacia in the knee.  He describes his pain as a pressure sensation over the anterolateral aspect of the knee.    12/6/23:  Mr. Toscano is a 33 year old male who reports onset of right knee pain in May 2022. He states that he was doing a dead lift and felt a sharp pain over the anterior aspect of the knee. Since the onset, he has continued to experience pain with activity. He currently describes pain over the anterior knee and a fullness in the back of the knee. He reports a catching feeling. He denies any giving way or locking.     Past treatment has included the following: physical therapy, activity modification, NSAIDS, and knee sleeve bracing.     Medications:    Current Outpatient Medications:     albuterol (PROVENTIL HFA) 90 mcg/actuation inhaler, Inhale 2 puffs into the lungs every 6 (six) hours as needed for Wheezing. Rescue, Disp: 18 g, Rfl: 11    budesonide-formoterol 80-4.5 mcg (SYMBICORT) 80-4.5 mcg/actuation HFAA, Inhale 2 puffs into the lungs Daily. Controller, Disp: 10 g, Rfl: 3    ketoconazole (NIZORAL) 2 % shampoo, Apply topically twice a week., Disp: 120 mL, Rfl: 2    meloxicam (MOBIC) 7.5 MG tablet, Take 1 tablet (7.5 mg total) by mouth once daily., Disp: 30 tablet, Rfl: 1    Allergies:  Review of patient's allergies indicates:   Allergen Reactions    House dust     Latex, natural rubber Hives       Past Medical History:  Past Medical History:   Diagnosis Date    Allergy     Asthma     Right knee pain         Past Surgical History:  Past Surgical History:   Procedure Laterality Date    APPENDECTOMY  2005    ESOPHAGOGASTRODUODENOSCOPY N/A 08/29/2023     Procedure: EGD (ESOPHAGOGASTRODUODENOSCOPY);  Surgeon: Glen Parada MD;  Location: [facility];  Service: Endoscopy;  Laterality: N/A;    WISDOM TOOTH EXTRACTION         Social History:  Social History     Occupational History    Not on file   Tobacco Use    Smoking status: Never    Smokeless tobacco: Never   Substance and Sexual Activity    Alcohol use: Yes     Alcohol/week: 10.0 standard drinks of alcohol     Types: 10 Glasses of wine per week     Comment: social drinker    Drug use: Never    Sexual activity: Yes     Partners: Female       Family History:  Family History   Problem Relation Name Age of Onset    Gallbladder disease Mother      Diabetes Father Ney, adopted     Mitral valve prolapse Father Ney, adopted     Neuropathy Father Ney, adopted     Emphysema Maternal Grandmother      Emphysema Maternal Grandfather          ROS:  Review of Systems   Musculoskeletal:  Positive for joint pain. Negative for joint swelling.   All other systems reviewed and are negative.      Vitals:  Ht 6' (1.829 m)   Wt 108.3 kg (238 lb 12.1 oz)   BMI 32.38 kg/m²     Physical Examination:  Comprehensive Orthopaedic Musculoskeletal Exam    General      Constitutional: appears stated age, well-developed and well-nourished    Scleral icterus: no    Labored breathing: no    Psychiatric: normal mood and affect and no acute distress    Neurological: alert and oriented x3    Skin: intact    Lymphadenopathy: none     Ortho Exam   Right knee exam:   No effusion.   Minimal tenderness along the medial and lateral joint line. Minimal tenderness along the medial and lateral patellar facets.   ROM full 0-140.   Quadriceps intact.  Patellar tilt to neutral. Patellar grind negative.   Stable ligament exam to varus and valgus. Lachmans 1A. Negative posterior drawer.     Imaging:  I have independently reviewed the following imaging studies performed at [facility]:    EXAMINATION:  XR KNEE COMP 4 OR MORE VIEWS RIGHT     CLINICAL  HISTORY:  Pain in right knee     TECHNIQUE:  Four views     COMPARISON:  None     FINDINGS:  The alignment is within normal limits.  No fracture.  No marrow replacement process.     Impression:     No acute findings.        Electronically signed by:Chidi Geiger MD  Date:                                            11/29/2022  Time:                                           10:02    EXAMINATION:  MRI KNEE WITHOUT CONTRAST RIGHT     CLINICAL HISTORY:  Knee pain, chronic, positive xray (Age >= 5y);     TECHNIQUE:  Routine MRI evaluation of the right knee without contrast.     COMPARISON:  Radiographs 11/29/2022.     FINDINGS:  Menisci: Medial and lateral menisci are intact.  Anterior and posterior root ligaments are normal.     Ligaments: ACL, PCL, MCL and posterior-lateral corner structures are normal.     Extensor Mechanism: Patellofemoral alignment is maintained.  Quadriceps and patellar tendons are intact.  MPFL and medial/lateral retinacula are normal.     Cartilage:     *Patellofemoral: Intact without partial or full-thickness defects.  No subchondral edema.  *Medial tibiofemoral: Chondral signal heterogeneity and suspected superficial fissuring at the lateral aspect of the central/posterior weight-bearing femoral condyle.  Tibial cartilage is preserved  *Lateral tibiofemoral: Focal area of full-thickness chondral fissuring with subchondral edema at the far posterior weight-bearing femoral condyle.  Tibial cartilage is preserved.  Bones: No fractures.  No avascular necrosis.  No marrow infiltrative process.     Miscellaneous: Small volume of intra-articular fluid.  No popliteal cyst.  Medial gastrocnemius, lateral gastrocnemius, distal semimembranosus and visualized pes anserine tendons are intact.  Distal iliotibial band is normal.  Visualized neurovascular structures demonstrate no significant abnormalities.     Impression:     1. Focal area of full-thickness chondral fissuring with subchondral edema at the far  posterior weight-bearing lateral femoral condyle.  2. Chondral signal heterogeneity and suspected superficial fissuring at the lateral aspect of the central/posterior weight-bearing medial femoral condyle.  No subchondral edema.        Electronically signed by:Med Underwood MD  Date:                                            12/02/2023  Time:                                           15:32    Assessment:  1. Chondromalacia of right knee      Plan:  I reviewed the findings once more with the patient. It has now been 2 years since the onset of his pain.  I discussed the option of right knee diagnostic arthroscopy.  I discussed the potential performing shaving chondroplasty, acquisition of a cartilage biopsy, and any other indicated procedures.  He wishes to proceed on July 11, 2024.    Orders Placed This Encounter    Diet NPO    Cleanse with Chlorhexidine (CHG)    Place PALMIRA hose    Place sequential compression device    IP VTE LOW RISK PATIENT    Case Request Operating Room: ARTHROSCOPY, KNEE, WITH CHONDROPLASTY     No follow-ups on file.

## 2024-07-08 ENCOUNTER — OFFICE VISIT (OUTPATIENT)
Dept: FAMILY MEDICINE | Facility: CLINIC | Age: 34
End: 2024-07-08
Payer: COMMERCIAL

## 2024-07-08 VITALS
DIASTOLIC BLOOD PRESSURE: 76 MMHG | SYSTOLIC BLOOD PRESSURE: 118 MMHG | HEIGHT: 72 IN | BODY MASS INDEX: 33.12 KG/M2 | HEART RATE: 67 BPM | OXYGEN SATURATION: 98 % | WEIGHT: 244.5 LBS

## 2024-07-08 DIAGNOSIS — Z11.4 SCREENING FOR HIV (HUMAN IMMUNODEFICIENCY VIRUS): ICD-10-CM

## 2024-07-08 DIAGNOSIS — S93.402D SPRAIN OF LEFT ANKLE, UNSPECIFIED LIGAMENT, SUBSEQUENT ENCOUNTER: ICD-10-CM

## 2024-07-08 DIAGNOSIS — E66.09 CLASS 1 OBESITY DUE TO EXCESS CALORIES WITHOUT SERIOUS COMORBIDITY WITH BODY MASS INDEX (BMI) OF 33.0 TO 33.9 IN ADULT: ICD-10-CM

## 2024-07-08 DIAGNOSIS — Z00.00 ANNUAL PHYSICAL EXAM: Primary | ICD-10-CM

## 2024-07-08 DIAGNOSIS — R11.0 NAUSEA: ICD-10-CM

## 2024-07-08 DIAGNOSIS — Z76.89 ENCOUNTER TO ESTABLISH CARE WITH NEW DOCTOR: ICD-10-CM

## 2024-07-08 DIAGNOSIS — M62.838 MUSCLE SPASM: ICD-10-CM

## 2024-07-08 DIAGNOSIS — Z11.59 ENCOUNTER FOR HEPATITIS C SCREENING TEST FOR LOW RISK PATIENT: ICD-10-CM

## 2024-07-08 DIAGNOSIS — N50.3 EPIDIDYMAL CYST: ICD-10-CM

## 2024-07-08 DIAGNOSIS — J45.20 MILD INTERMITTENT ASTHMA WITHOUT COMPLICATION: ICD-10-CM

## 2024-07-08 DIAGNOSIS — F43.20 ADJUSTMENT DISORDER, UNSPECIFIED TYPE: ICD-10-CM

## 2024-07-08 PROCEDURE — 3074F SYST BP LT 130 MM HG: CPT | Mod: CPTII,S$GLB,, | Performed by: FAMILY MEDICINE

## 2024-07-08 PROCEDURE — 3008F BODY MASS INDEX DOCD: CPT | Mod: CPTII,S$GLB,, | Performed by: FAMILY MEDICINE

## 2024-07-08 PROCEDURE — 99999 PR PBB SHADOW E&M-EST. PATIENT-LVL IV: CPT | Mod: PBBFAC,,, | Performed by: FAMILY MEDICINE

## 2024-07-08 PROCEDURE — 1159F MED LIST DOCD IN RCRD: CPT | Mod: CPTII,S$GLB,, | Performed by: FAMILY MEDICINE

## 2024-07-08 PROCEDURE — 99395 PREV VISIT EST AGE 18-39: CPT | Mod: S$GLB,,, | Performed by: FAMILY MEDICINE

## 2024-07-08 PROCEDURE — 3078F DIAST BP <80 MM HG: CPT | Mod: CPTII,S$GLB,, | Performed by: FAMILY MEDICINE

## 2024-07-08 RX ORDER — CYCLOBENZAPRINE HCL 10 MG
10 TABLET ORAL 3 TIMES DAILY PRN
Qty: 30 TABLET | Refills: 1 | Status: SHIPPED | OUTPATIENT
Start: 2024-07-08

## 2024-07-08 RX ORDER — ONDANSETRON HYDROCHLORIDE 8 MG/1
8 TABLET, FILM COATED ORAL EVERY 8 HOURS PRN
Qty: 30 TABLET | Refills: 1 | Status: SHIPPED | OUTPATIENT
Start: 2024-07-08

## 2024-07-08 NOTE — PROGRESS NOTES
(Portions of this note were dictated using voice recognition software and may contain dictation related errors in spelling/grammar/syntax not found on text review)    CC:   Chief Complaint   Patient presents with    [facility]       HPI: 33 y.o. male presented to [facility] as a new patient for routine annual checkup and labs.  He has medical history significant for mild intermittent asthma, chronic allergic rhinitis, history of right knee chondromalacia.    Patient reports that he had cyst in the scrotum for the last 2 years which was only painful with palpation and size was stable, however, recently it has started to become more painful and increased in size. He was seen by urology NP, was prescribed doxycycline for 7 days, ultrasound of the scrotum was done which showed epididymal head cyst.  Patient reports that ever since size has increased and it is more painful, denies having any discharge or drainage from it, he would like to have this cyst removed.    He has asthma, describes having flare-up with seasonal changes as well as exercise, uses albuterol as needed and also use Symbicort on daily basis.  Patient is interested in having a spacer device as he was using in the past and had more effect by using it, would also like to have a script for nebulizer.    He is due for annual labs, presented with annual screening forms for insurance to be completed.    He does not smoke, has no toxic habits.      He is physically active, does regular exercise and workout in the gym.  He was  before starting this new job, currently working as a dialysis nurse.    He has chronic muscle spasm in the lower back for which he takes Flexeril as needed, would like to have refills.    He would like to have script for Zofran which he uses on as needed basis during traveling.    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    He reports having twisting of the ankle on July 4th and had sprain, was seen in urgent Care, currently wearing walking boot.    He denies having any other symptoms or concerns.    Past Medical History:   Diagnosis Date    Allergy     Asthma     Right knee pain        Past Surgical History:   Procedure Laterality Date    APPENDECTOMY  2005    ESOPHAGOGASTRODUODENOSCOPY N/A 08/29/2023    Procedure: EGD (ESOPHAGOGASTRODUODENOSCOPY);  Surgeon: Glen Parada MD;  Location: [facility];  Service: Endoscopy;  Laterality: N/A;    WISDOM TOOTH EXTRACTION         Family History   Problem Relation Name Age of Onset    Gallbladder disease Mother      Diabetes Father Ney, adopted     Mitral valve prolapse Father Ney, adopted     Neuropathy Father Ney, adopted     Emphysema Maternal Grandmother      Emphysema Maternal Grandfather         Social History     Tobacco Use    Smoking status: Never    Smokeless tobacco: Never   Substance Use Topics    Alcohol use: Yes     Alcohol/week: 10.0 standard drinks of alcohol     Types: 10 Glasses of wine per week     Comment: social drinker    Drug use: Never       Lab Results   Component Value Date    WBC 4.69 09/01/2023    HGB 14.5 09/01/2023    HCT 43.4 09/01/2023    MCV 94 09/01/2023     09/01/2023    CHOL 192 09/01/2023    TRIG 89 09/01/2023    HDL 56 09/01/2023    ALT 27 09/01/2023    AST 19 09/01/2023    BILITOT 0.8 09/01/2023    ALKPHOS 64 09/01/2023     09/01/2023    K 4.1 09/01/2023     09/01/2023    CREATININE 1.2 09/01/2023    CALCIUM 9.3 09/01/2023    ALBUMIN 4.2 09/01/2023    BUN 17 09/01/2023    CO2 26 09/01/2023    TSH 1.315 09/01/2023    PSA 0.25 09/01/2023    HGBA1C 5.3 09/01/2023    LDLCALC 118.2 09/01/2023     09/01/2023    [id number] 86 09/01/2023             Vital signs reviewed  PE:   APPEARANCE: Well nourished, well developed, in no acute distress.    HEAD:  Normocephalic, atraumatic.  EYES: EOMI.  Conjunctivae noninjected.  NOSE: Mucosa pink. Airway clear.  MOUTH & THROAT: No tonsillar enlargement. No pharyngeal erythema or exudate.   NECK: Supple with no cervical lymphadenopathy.    CHEST: Good inspiratory effort. Lungs clear to auscultation with no wheezes or crackles.  CARDIOVASCULAR: Normal S1, S2. No rubs, murmurs, or gallops.  ABDOMEN: Bowel sounds normal. Not distended. Soft. No tenderness or masses. No organomegaly.  EXTREMITIES: No edema, cyanosis, or clubbing.    Review of Systems   Constitutional:  Negative for chills, fatigue and fever.   HENT: Negative.     Respiratory:  Negative for cough, shortness of breath and wheezing.    Cardiovascular:  Negative for chest pain, palpitations and leg swelling.   Gastrointestinal: Negative.    Genitourinary: Negative.    Musculoskeletal:  Positive for joint swelling.   Neurological: Negative.    Psychiatric/Behavioral: Negative.     All other systems reviewed and are negative.      IMPRESSION  1. Annual physical exam    2. Epididymal cyst    3. Screening for HIV (human immunodeficiency virus)    4. Encounter for hepatitis C screening test for low risk patient    5. Adjustment disorder, unspecified type    6. Mild intermittent asthma without complication    7. Nausea    8. Muscle spasm    9. Encounter to establish care with new doctor    10. Sprain of left ankle, unspecified ligament, subsequent encounter    11. Class 1 obesity due to excess calories without serious comorbidity with body mass index (BMI) of 33.0 to 33.9 in adult            PLAN      1. Annual physical exam    - CBC Auto Differential; Future  - Comprehensive Metabolic Panel; Future  - Lipid Panel; Future  - Hemoglobin A1C; Future  - TSH; Future      2. Epididymal cyst    US scrotum reviewed, s/p doxycyline x 7 days    - Ambulatory referral/consult to Urology; Future      3. Screening for HIV (human immunodeficiency virus)    - HIV 1/2 Ag/Ab (4th Gen);  Future      4. Encounter for hepatitis C screening test for low risk patient    - Hepatitis C Antibody; Future      5. Adjustment disorder, unspecified type    - Ambulatory referral/consult to Psychology; Future      6. Mild intermittent asthma without complication    - SPACER WITH MASK FOR HOME USE  - NEBULIZER FOR HOME USE    Continue current inhalers      7. Nausea    - ondansetron (ZOFRAN) 8 MG tablet; Take 1 tablet (8 mg total) by mouth every 8 (eight) hours as needed for Nausea.  Dispense: 30 tablet; Refill: 1      8. Muscle spasm    - cyclobenzaprine (FLEXERIL) 10 MG tablet; Take 1 tablet (10 mg total) by mouth 3 (three) times daily as needed for Muscle spasms.  Dispense: 30 tablet; Refill: 1      9. Encounter to establish care with new doctor        10. Sprain of left ankle, unspecified ligament, subsequent encounter    Continue ibuprofen prn        11. Class 1 obesity due to excess calories without serious comorbidity with body mass index (BMI) of 33.0 to 33.9 in adult      Counseling provided on healthy lifestyle, encouraged to do moderate intensity regular exercise 30 minutes every day 5 days a week, to include vegetables and fruits and cut back on saturated fats and carbohydrates.            SCREENINGS      Immunizations:     UTD with Tdap    UTD with Covid vaccines        Age/demographic appropriate health maintenance:    Health Maintenance Due   Topic Date Due    Hepatitis C Screening  Never done    HIV Screening  Never done           Spent adequate time in obtaining history and explaining differentials     40 minutes spent during this visit of which greater than 50% devoted to face-face counseling and coordination of care regarding diagnosis and management plan       Torres Parrish   7/8/2024

## 2024-07-11 ENCOUNTER — LAB VISIT (OUTPATIENT)
Dept: LAB | Facility: HOSPITAL | Age: 34
End: 2024-07-11
Attending: FAMILY MEDICINE
Payer: COMMERCIAL

## 2024-07-11 DIAGNOSIS — Z11.4 SCREENING FOR HIV (HUMAN IMMUNODEFICIENCY VIRUS): ICD-10-CM

## 2024-07-11 DIAGNOSIS — Z00.00 ANNUAL PHYSICAL EXAM: ICD-10-CM

## 2024-07-11 DIAGNOSIS — Z11.59 ENCOUNTER FOR HEPATITIS C SCREENING TEST FOR LOW RISK PATIENT: ICD-10-CM

## 2024-07-11 LAB
ALBUMIN SERPL BCP-MCNC: 3.9 G/DL (ref 3.5–5.2)
ALP SERPL-CCNC: 78 U/L (ref 55–135)
ALT SERPL W/O P-5'-P-CCNC: 32 U/L (ref 10–44)
ANION GAP SERPL CALC-SCNC: 8 MMOL/L (ref 8–16)
AST SERPL-CCNC: 22 U/L (ref 10–40)
BASOPHILS # BLD AUTO: 0.03 K/UL (ref 0–0.2)
BASOPHILS NFR BLD: 0.5 % (ref 0–1.9)
BILIRUB SERPL-MCNC: 0.6 MG/DL (ref 0.1–1)
BUN SERPL-MCNC: 18 MG/DL (ref 6–20)
CALCIUM SERPL-MCNC: 8.9 MG/DL (ref 8.7–10.5)
CHLORIDE SERPL-SCNC: 105 MMOL/L (ref 95–110)
CHOLEST SERPL-MCNC: 203 MG/DL (ref 120–199)
CHOLEST/HDLC SERPL: 3.6 {RATIO} (ref 2–5)
CO2 SERPL-SCNC: 22 MMOL/L (ref 23–29)
CREAT SERPL-MCNC: 1.2 MG/DL (ref 0.5–1.4)
DIFFERENTIAL METHOD BLD: ABNORMAL
EOSINOPHIL # BLD AUTO: 0.2 K/UL (ref 0–0.5)
EOSINOPHIL NFR BLD: 3.1 % (ref 0–8)
ERYTHROCYTE [DISTWIDTH] IN BLOOD BY AUTOMATED COUNT: 12.3 % (ref 11.5–14.5)
EST. GFR  (NO RACE VARIABLE): >60 ML/MIN/1.73 M^2
ESTIMATED AVG GLUCOSE: 108 MG/DL (ref 68–131)
GLUCOSE SERPL-MCNC: 108 MG/DL (ref 70–110)
HBA1C MFR BLD: 5.4 % (ref 4–5.6)
HCT VFR BLD AUTO: 42.5 % (ref 40–54)
HCV AB SERPL QL IA: NORMAL
HDLC SERPL-MCNC: 56 MG/DL (ref 40–75)
HDLC SERPL: 27.6 % (ref 20–50)
HGB BLD-MCNC: 14.5 G/DL (ref 14–18)
HIV 1+2 AB+HIV1 P24 AG SERPL QL IA: NORMAL
IMM GRANULOCYTES # BLD AUTO: 0.01 K/UL (ref 0–0.04)
IMM GRANULOCYTES NFR BLD AUTO: 0.2 % (ref 0–0.5)
LDLC SERPL CALC-MCNC: 126.2 MG/DL (ref 63–159)
LYMPHOCYTES # BLD AUTO: 2.3 K/UL (ref 1–4.8)
LYMPHOCYTES NFR BLD: 41 % (ref 18–48)
MCH RBC QN AUTO: 31.3 PG (ref 27–31)
MCHC RBC AUTO-ENTMCNC: 34.1 G/DL (ref 32–36)
MCV RBC AUTO: 92 FL (ref 82–98)
MONOCYTES # BLD AUTO: 0.3 K/UL (ref 0.3–1)
MONOCYTES NFR BLD: 6.1 % (ref 4–15)
NEUTROPHILS # BLD AUTO: 2.7 K/UL (ref 1.8–7.7)
NEUTROPHILS NFR BLD: 49.1 % (ref 38–73)
NONHDLC SERPL-MCNC: 147 MG/DL
NRBC BLD-RTO: 0 /100 WBC
PLATELET # BLD AUTO: 221 K/UL (ref 150–450)
PMV BLD AUTO: 10.6 FL (ref 9.2–12.9)
POTASSIUM SERPL-SCNC: 4.2 MMOL/L (ref 3.5–5.1)
PROT SERPL-MCNC: 7.1 G/DL (ref 6–8.4)
RBC # BLD AUTO: 4.63 M/UL (ref 4.6–6.2)
SODIUM SERPL-SCNC: 135 MMOL/L (ref 136–145)
TRIGL SERPL-MCNC: 104 MG/DL (ref 30–150)
TSH SERPL DL<=0.005 MIU/L-ACNC: 2.54 UIU/ML (ref 0.4–4)
WBC # BLD AUTO: 5.56 K/UL (ref 3.9–12.7)

## 2024-07-11 PROCEDURE — 86803 HEPATITIS C AB TEST: CPT | Performed by: FAMILY MEDICINE

## 2024-07-11 PROCEDURE — 80053 COMPREHEN METABOLIC PANEL: CPT | Performed by: FAMILY MEDICINE

## 2024-07-11 PROCEDURE — 87389 HIV-1 AG W/HIV-1&-2 AB AG IA: CPT | Performed by: FAMILY MEDICINE

## 2024-07-11 PROCEDURE — 83036 HEMOGLOBIN GLYCOSYLATED A1C: CPT | Performed by: FAMILY MEDICINE

## 2024-07-11 PROCEDURE — 36415 COLL VENOUS BLD VENIPUNCTURE: CPT | Performed by: FAMILY MEDICINE

## 2024-07-11 PROCEDURE — 80061 LIPID PANEL: CPT | Performed by: FAMILY MEDICINE

## 2024-07-11 PROCEDURE — 84443 ASSAY THYROID STIM HORMONE: CPT | Performed by: FAMILY MEDICINE

## 2024-07-11 PROCEDURE — 85025 COMPLETE CBC W/AUTO DIFF WBC: CPT | Performed by: FAMILY MEDICINE

## 2024-07-17 ENCOUNTER — OFFICE VISIT (OUTPATIENT)
Dept: UROLOGY | Facility: CLINIC | Age: 34
End: 2024-07-17
Payer: COMMERCIAL

## 2024-07-17 VITALS
BODY MASS INDEX: 33.12 KG/M2 | HEART RATE: 83 BPM | HEIGHT: 72 IN | WEIGHT: 244.5 LBS | SYSTOLIC BLOOD PRESSURE: 153 MMHG | RESPIRATION RATE: 18 BRPM | DIASTOLIC BLOOD PRESSURE: 77 MMHG

## 2024-07-17 DIAGNOSIS — N50.82 SCROTAL PAIN: Primary | ICD-10-CM

## 2024-07-17 DIAGNOSIS — N50.3 EPIDIDYMAL CYST: ICD-10-CM

## 2024-07-17 PROCEDURE — 3044F HG A1C LEVEL LT 7.0%: CPT | Mod: CPTII,S$GLB,, | Performed by: NURSE PRACTITIONER

## 2024-07-17 PROCEDURE — 3008F BODY MASS INDEX DOCD: CPT | Mod: CPTII,S$GLB,, | Performed by: NURSE PRACTITIONER

## 2024-07-17 PROCEDURE — 3078F DIAST BP <80 MM HG: CPT | Mod: CPTII,S$GLB,, | Performed by: NURSE PRACTITIONER

## 2024-07-17 PROCEDURE — 3077F SYST BP >= 140 MM HG: CPT | Mod: CPTII,S$GLB,, | Performed by: NURSE PRACTITIONER

## 2024-07-17 PROCEDURE — 99213 OFFICE O/P EST LOW 20 MIN: CPT | Mod: S$GLB,,, | Performed by: NURSE PRACTITIONER

## 2024-07-17 PROCEDURE — 1160F RVW MEDS BY RX/DR IN RCRD: CPT | Mod: CPTII,S$GLB,, | Performed by: NURSE PRACTITIONER

## 2024-07-17 PROCEDURE — 1159F MED LIST DOCD IN RCRD: CPT | Mod: CPTII,S$GLB,, | Performed by: NURSE PRACTITIONER

## 2024-07-17 RX ORDER — GABAPENTIN 100 MG/1
100 CAPSULE ORAL 3 TIMES DAILY
Qty: 90 CAPSULE | Refills: 11 | Status: SHIPPED | OUTPATIENT
Start: 2024-07-17 | End: 2025-07-17

## 2024-07-17 NOTE — PROGRESS NOTES
Subjective:      Ze Toscano is a 33 y.o. male who was referred by Dr. Parrish for evaluation of his epididymal cyst.    The patient presents today reporting a R epididymal cyst that he first noticed in March. He states that it has increased in size and has become more painful over the last several months. The pain is aggravated with palpation. Wearing boxer briefs and taking ibuprofen PRN.     He denies dysuria, frequency, urgency, gross hematuria, and penile discharge. In a monogamous relationship.     He was evaluated by Amber Barone NP at [facility] on 6/6 regarding the R epididymal cyst. Treated empirically with doxycycline with no improvement in pain.     The following portions of the patient's history were reviewed and updated as appropriate: allergies, current medications, past family history, past medical history, past social history, past surgical history and problem list.    Review of Systems  Constitutional: no fever or chills  ENT: no nasal congestion or sore throat  Respiratory: no cough or shortness of breath  Cardiovascular: no chest pain or palpitations  Gastrointestinal: no nausea or vomiting, tolerating diet  Genitourinary: as per HPI  Hematologic/Lymphatic: no easy bruising or lymphadenopathy  Musculoskeletal: no arthralgias or myalgias  Neurological: no seizures or tremors  Behavioral/Psych: no auditory or visual hallucinations     Objective:   Vitals:   Vitals:    07/17/24 0836   BP: (!) 153/77   Pulse: 83   Resp: 18     Physical Exam   General: alert and oriented, no acute distress  Head: normocephalic, atraumatic  Neck: supple, normal ROM  Respiratory: Symmetric expansion, non-labored breathing  Cardiovascular: regular rate and rhythm  Abdomen: soft, non tender, non distended  Genitourinary:   Penis: normal, no lesions, patent orthotopic meatus, no plaques  Scrotum: no rashes or skin changes;   Testes: descended bilaterally, no masses, <1cm R epididymal cyst; otherwise normal epididymides  "bilaterally, no hydroceles  Skin: normal coloration and turgor, no rashes, no suspicious skin lesions noted  Neuro: alert and oriented x3, no gross deficits  Psych: normal judgment and insight, normal mood/affect, and non-anxious    Lab Review   Urinalysis demonstrates : no specimen  Lab Results   Component Value Date    WBC 5.56 07/11/2024    HGB 14.5 07/11/2024    HCT 42.5 07/11/2024    MCV 92 07/11/2024     07/11/2024     Lab Results   Component Value Date    CREATININE 1.2 07/11/2024    BUN 18 07/11/2024     Lab Results   Component Value Date    PSA 0.25 09/01/2023     Imaging   (all images personally reviewed; agree with report below)  Scrotal US- "US SCROTUM AND TESTICLES  FINDINGS:  Right Testicle:  *Size: 4.5 x 3.1 x 3.3 cm  *Appearance: Homogeneous  *Flow: Present  *Epididymis: No abnormal hypervascularity.  Epididymal head cyst measuring 5 mm.  Suspected small epididymal appendage measuring 6 mm.  *Hydrocele: None significant.  *Varicocele: None.  Left Testicle:  *Size: 4.2 x 2.6 x 3.7 cm  *Appearance: Homogeneous  *Flow: Present  *Epididymis: No abnormal hypervascularity.  *Hydrocele: None significant.  *Varicocele: None.  Other findings: None.  Impression:  No suspicious intratesticular lesion or torsion.  Small right epididymal head cyst and appendage."    Assessment:     1. Scrotal pain    2. Epididymal cyst      Plan:   Diagnoses and all orders for this visit:    Scrotal pain  -     gabapentin (NEURONTIN) 100 MG capsule; Take 1 capsule (100 mg total) by mouth 3 (three) times daily.  -     Ureaplasma PCR Urine; Standing  -     Mycoplasma genitalium Molecular Detection, PCR Urine; Standing  -     C. trachomatis/N. gonorrhoeae by AMP DNA [facility]; Urine; Standing    Epididymal cyst  -     Ambulatory referral/consult to Urology    Plan:  --Pt unable to provide urine sample today  --Urine testing at the lab  --Trial of Neurontin TID  --Scrotal support!  --Discussed PFPT vs pain management referral " for consideration of cord block, pt declines for now  --Given size of the cyst do no recommend surgical intervention  --He would like a 2nd opinion from MD- follow up with Dr. Douglas

## 2024-07-25 DIAGNOSIS — J45.20 MILD INTERMITTENT ASTHMA WITHOUT COMPLICATION: ICD-10-CM

## 2024-07-25 RX ORDER — ALBUTEROL SULFATE 90 UG/1
2 AEROSOL, METERED RESPIRATORY (INHALATION) EVERY 6 HOURS PRN
Qty: 18 G | Refills: 11 | Status: SHIPPED | OUTPATIENT
Start: 2024-07-25 | End: 2025-07-25

## 2024-07-25 NOTE — TELEPHONE ENCOUNTER
----- Message from Bo Caballero sent at 7/25/2024 11:49 AM CDT -----  Type:  Needs Medical Advice    Who Called: pt  Pharmacy name and phone #:  [facility] [place] [address] [address]   Phone: [phone]  Fax: [phone]  Would the patient rather a call back or a response via [facility]? call  Best Call Back Number: [phone]  Additional Information:     Type:  NEW RX  Request  Refill or New Rx:New  RX Name and Strength:albuterol for the nebulizer   How is the patient currently taking it? (ex. 1XDay):  Is this a 30 day or 90 day RX:

## 2024-07-26 ENCOUNTER — TELEPHONE (OUTPATIENT)
Dept: FAMILY MEDICINE | Facility: CLINIC | Age: 34
End: 2024-07-26
Payer: COMMERCIAL

## 2024-07-26 DIAGNOSIS — R11.0 NAUSEA: Primary | ICD-10-CM

## 2024-07-26 DIAGNOSIS — J45.20 MILD INTERMITTENT ASTHMA WITHOUT COMPLICATION: ICD-10-CM

## 2024-07-26 RX ORDER — ALBUTEROL SULFATE 0.83 MG/ML
2.5 SOLUTION RESPIRATORY (INHALATION) EVERY 6 HOURS PRN
Qty: 20 ML | Refills: 2 | Status: SHIPPED | OUTPATIENT
Start: 2024-07-26 | End: 2025-07-26

## 2024-07-26 RX ORDER — ONDANSETRON 4 MG/1
4 TABLET, ORALLY DISINTEGRATING ORAL 2 TIMES DAILY
Qty: 30 TABLET | Refills: 2 | Status: SHIPPED | OUTPATIENT
Start: 2024-07-26

## 2024-07-26 NOTE — TELEPHONE ENCOUNTER
Pt came to the office and stated that he needed to change the Zofran. Pt states that he takes the 4mg dissolvable tablets and needs a refill to [facility]. Pt is also requesting the nebulizer solution for his machine. Pls advise.

## 2024-07-31 DIAGNOSIS — J45.20 MILD INTERMITTENT ASTHMA WITHOUT COMPLICATION: ICD-10-CM

## 2024-07-31 RX ORDER — ALBUTEROL SULFATE 0.83 MG/ML
2.5 SOLUTION RESPIRATORY (INHALATION) EVERY 6 HOURS PRN
Qty: 20 ML | Refills: 2 | Status: SHIPPED | OUTPATIENT
Start: 2024-07-31 | End: 2025-07-31

## 2024-07-31 NOTE — TELEPHONE ENCOUNTER
----- Message from Sade Crump sent at 7/31/2024 12:31 PM CDT -----    Type:  RX Refill Request    Who Called: [facility]  Refill or New Rx:  RX Name and Strength: alburterol that goes in nebulizer (caller said)    Preferred Pharmacy:     Ordering Provider:  Reach pt via:  Best Call Back Number: (call) (caller did not leave fax)  Additional Information: caller said a rx was called in for an albuterol inhaler but pt needs the one listed above

## 2024-09-19 DIAGNOSIS — J45.20 MILD INTERMITTENT ASTHMA WITHOUT COMPLICATION: ICD-10-CM

## 2024-09-19 RX ORDER — BUDESONIDE AND FORMOTEROL FUMARATE 80; 4.5 UG/1; UG/1
AEROSOL, METERED RESPIRATORY (INHALATION)
Qty: 11 G | Refills: 0 | Status: SHIPPED | OUTPATIENT
Start: 2024-09-19

## 2024-09-19 RX ORDER — ALBUTEROL SULFATE 90 UG/1
2 INHALANT RESPIRATORY (INHALATION) EVERY 6 HOURS PRN
Qty: 7 G | Refills: 0 | Status: SHIPPED | OUTPATIENT
Start: 2024-09-19

## 2024-09-19 RX ORDER — ALBUTEROL SULFATE 0.83 MG/ML
2.5 SOLUTION RESPIRATORY (INHALATION) EVERY 6 HOURS PRN
Qty: 20 ML | Refills: 2 | Status: SHIPPED | OUTPATIENT
Start: 2024-09-19 | End: 2025-09-19

## 2024-10-28 ENCOUNTER — HOSPITAL ENCOUNTER (OUTPATIENT)
Dept: RADIOLOGY | Facility: HOSPITAL | Age: 34
Discharge: HOME | End: 2024-10-28
Attending: ORTHOPAEDIC SURGERY
Payer: COMMERCIAL

## 2024-10-28 ENCOUNTER — NURSE TRIAGE (OUTPATIENT)
Dept: ADMINISTRATIVE | Facility: CLINIC | Age: 34
End: 2024-10-28
Payer: COMMERCIAL

## 2024-10-28 ENCOUNTER — OFFICE VISIT (OUTPATIENT)
Dept: ORTHOPEDICS | Facility: CLINIC | Age: 34
End: 2024-10-28
Payer: COMMERCIAL

## 2024-10-28 ENCOUNTER — TELEPHONE (OUTPATIENT)
Dept: ORTHOPEDICS | Facility: CLINIC | Age: 34
End: 2024-10-28
Payer: COMMERCIAL

## 2024-10-28 VITALS — HEIGHT: 73 IN | WEIGHT: 244.5 LBS | BODY MASS INDEX: 32.4 KG/M2

## 2024-10-28 DIAGNOSIS — M51.369 DEGENERATION OF INTERVERTEBRAL DISC OF LUMBAR REGION, UNSPECIFIED WHETHER PAIN PRESENT: ICD-10-CM

## 2024-10-28 DIAGNOSIS — M62.838 MUSCLE SPASM: ICD-10-CM

## 2024-10-28 DIAGNOSIS — M51.369 DEGENERATION OF INTERVERTEBRAL DISC OF LUMBAR REGION, UNSPECIFIED WHETHER PAIN PRESENT: Primary | ICD-10-CM

## 2024-10-28 DIAGNOSIS — M54.9 DORSALGIA, UNSPECIFIED: Primary | ICD-10-CM

## 2024-10-28 PROCEDURE — 99204 OFFICE O/P NEW MOD 45 MIN: CPT | Mod: S$GLB,,, | Performed by: ORTHOPAEDIC SURGERY

## 2024-10-28 PROCEDURE — 99999 PR PBB SHADOW E&M-EST. PATIENT-LVL III: CPT | Mod: PBBFAC,,, | Performed by: ORTHOPAEDIC SURGERY

## 2024-10-28 PROCEDURE — 3044F HG A1C LEVEL LT 7.0%: CPT | Mod: CPTII,S$GLB,, | Performed by: ORTHOPAEDIC SURGERY

## 2024-10-28 PROCEDURE — 72110 X-RAY EXAM L-2 SPINE 4/>VWS: CPT | Mod: 26,,, | Performed by: RADIOLOGY

## 2024-10-28 PROCEDURE — 3008F BODY MASS INDEX DOCD: CPT | Mod: CPTII,S$GLB,, | Performed by: ORTHOPAEDIC SURGERY

## 2024-10-28 PROCEDURE — 72110 X-RAY EXAM L-2 SPINE 4/>VWS: CPT | Mod: TC

## 2024-10-28 PROCEDURE — 1159F MED LIST DOCD IN RCRD: CPT | Mod: CPTII,S$GLB,, | Performed by: ORTHOPAEDIC SURGERY

## 2024-10-28 RX ORDER — MELOXICAM 15 MG/1
15 TABLET ORAL DAILY
Qty: 30 TABLET | Refills: 0 | Status: SHIPPED | OUTPATIENT
Start: 2024-10-28

## 2024-10-28 RX ORDER — BACLOFEN 10 MG/1
10 TABLET ORAL 3 TIMES DAILY
Qty: 90 TABLET | Refills: 0 | Status: SHIPPED | OUTPATIENT
Start: 2024-10-28 | End: 2025-10-28

## 2024-10-28 RX ORDER — METHYLPREDNISOLONE 4 MG/1
TABLET ORAL
Qty: 1 EACH | Refills: 0 | Status: SHIPPED | OUTPATIENT
Start: 2024-10-28 | End: 2024-11-18

## 2024-10-28 RX ORDER — CYCLOBENZAPRINE HCL 10 MG
10 TABLET ORAL 3 TIMES DAILY PRN
Qty: 30 TABLET | Refills: 1 | Status: SHIPPED | OUTPATIENT
Start: 2024-10-28

## 2024-10-29 ENCOUNTER — TELEPHONE (OUTPATIENT)
Dept: FAMILY MEDICINE | Facility: CLINIC | Age: 34
End: 2024-10-29
Payer: COMMERCIAL

## 2024-10-29 ENCOUNTER — OFFICE VISIT (OUTPATIENT)
Dept: FAMILY MEDICINE | Facility: CLINIC | Age: 34
End: 2024-10-29
Payer: COMMERCIAL

## 2024-10-29 VITALS
OXYGEN SATURATION: 100 % | WEIGHT: 238.13 LBS | SYSTOLIC BLOOD PRESSURE: 116 MMHG | HEART RATE: 93 BPM | HEIGHT: 73 IN | DIASTOLIC BLOOD PRESSURE: 79 MMHG | BODY MASS INDEX: 31.56 KG/M2

## 2024-10-29 DIAGNOSIS — M54.41 CHRONIC RIGHT-SIDED LOW BACK PAIN WITH RIGHT-SIDED SCIATICA: Primary | ICD-10-CM

## 2024-10-29 DIAGNOSIS — G89.29 CHRONIC RIGHT-SIDED LOW BACK PAIN WITH RIGHT-SIDED SCIATICA: Primary | ICD-10-CM

## 2024-10-29 DIAGNOSIS — Z11.3 SCREENING FOR STD (SEXUALLY TRANSMITTED DISEASE): ICD-10-CM

## 2024-10-29 DIAGNOSIS — N50.82 SCROTAL PAIN: ICD-10-CM

## 2024-10-29 DIAGNOSIS — M54.9 DORSALGIA: ICD-10-CM

## 2024-10-29 PROCEDURE — 3044F HG A1C LEVEL LT 7.0%: CPT | Mod: CPTII,S$GLB,, | Performed by: FAMILY MEDICINE

## 2024-10-29 PROCEDURE — 3078F DIAST BP <80 MM HG: CPT | Mod: CPTII,S$GLB,, | Performed by: FAMILY MEDICINE

## 2024-10-29 PROCEDURE — 3008F BODY MASS INDEX DOCD: CPT | Mod: CPTII,S$GLB,, | Performed by: FAMILY MEDICINE

## 2024-10-29 PROCEDURE — 99999 PR PBB SHADOW E&M-EST. PATIENT-LVL V: CPT | Mod: PBBFAC,,, | Performed by: FAMILY MEDICINE

## 2024-10-29 PROCEDURE — 3074F SYST BP LT 130 MM HG: CPT | Mod: CPTII,S$GLB,, | Performed by: FAMILY MEDICINE

## 2024-10-29 PROCEDURE — 99214 OFFICE O/P EST MOD 30 MIN: CPT | Mod: S$GLB,,, | Performed by: FAMILY MEDICINE

## 2024-10-29 PROCEDURE — 1159F MED LIST DOCD IN RCRD: CPT | Mod: CPTII,S$GLB,, | Performed by: FAMILY MEDICINE

## 2024-10-29 RX ORDER — HYDROCODONE BITARTRATE AND ACETAMINOPHEN 5; 325 MG/1; MG/1
1 TABLET ORAL EVERY 12 HOURS PRN
Qty: 20 TABLET | Refills: 0 | Status: SHIPPED | OUTPATIENT
Start: 2024-10-29

## 2024-10-31 ENCOUNTER — OFFICE VISIT (OUTPATIENT)
Dept: PAIN MEDICINE | Facility: CLINIC | Age: 34
End: 2024-10-31
Payer: COMMERCIAL

## 2024-10-31 VITALS
HEART RATE: 88 BPM | BODY MASS INDEX: 30.93 KG/M2 | SYSTOLIC BLOOD PRESSURE: 135 MMHG | WEIGHT: 233.38 LBS | DIASTOLIC BLOOD PRESSURE: 80 MMHG | HEIGHT: 73 IN

## 2024-10-31 DIAGNOSIS — M54.41 CHRONIC RIGHT-SIDED LOW BACK PAIN WITH RIGHT-SIDED SCIATICA: ICD-10-CM

## 2024-10-31 DIAGNOSIS — M54.16 ACUTE RADICULAR LOW BACK PAIN: Primary | ICD-10-CM

## 2024-10-31 DIAGNOSIS — G89.29 CHRONIC RIGHT-SIDED LOW BACK PAIN WITH RIGHT-SIDED SCIATICA: ICD-10-CM

## 2024-10-31 PROCEDURE — 99999 PR PBB SHADOW E&M-EST. PATIENT-LVL IV: CPT | Mod: PBBFAC,,, | Performed by: STUDENT IN AN ORGANIZED HEALTH CARE EDUCATION/TRAINING PROGRAM

## 2024-11-05 ENCOUNTER — HOSPITAL ENCOUNTER (OUTPATIENT)
Dept: RADIOLOGY | Facility: HOSPITAL | Age: 34
Discharge: HOME | End: 2024-11-05
Attending: ORTHOPAEDIC SURGERY
Payer: COMMERCIAL

## 2024-11-05 DIAGNOSIS — M54.9 DORSALGIA, UNSPECIFIED: ICD-10-CM

## 2024-11-05 PROCEDURE — 72148 MRI LUMBAR SPINE W/O DYE: CPT | Mod: TC

## 2024-11-05 PROCEDURE — 72148 MRI LUMBAR SPINE W/O DYE: CPT | Mod: 26,,, | Performed by: RADIOLOGY
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
## 2024-11-06 ENCOUNTER — TELEPHONE (OUTPATIENT)
Dept: PAIN MEDICINE | Facility: CLINIC | Age: 34
End: 2024-11-06
Payer: COMMERCIAL

## 2024-11-06 ENCOUNTER — OFFICE VISIT (OUTPATIENT)
Dept: ORTHOPEDICS | Facility: CLINIC | Age: 34
End: 2024-11-06
Payer: COMMERCIAL

## 2024-11-06 DIAGNOSIS — M51.369 DEGENERATION OF INTERVERTEBRAL DISC OF LUMBAR REGION, UNSPECIFIED WHETHER PAIN PRESENT: Primary | ICD-10-CM

## 2024-11-06 DIAGNOSIS — M54.16 LUMBAR RADICULOPATHY: Primary | ICD-10-CM

## 2024-11-06 PROCEDURE — 99213 OFFICE O/P EST LOW 20 MIN: CPT | Mod: 95,,, | Performed by: ORTHOPAEDIC SURGERY

## 2024-11-06 PROCEDURE — 3044F HG A1C LEVEL LT 7.0%: CPT | Mod: CPTII,95,, | Performed by: ORTHOPAEDIC SURGERY

## 2024-11-06 NOTE — TELEPHONE ENCOUNTER
----- Message from Gillian Mendez PA-C sent at 2024  8:10 AM CST -----  Regarding: Order for KINGA VALERO    Patient Name: KINGA VALERO([id number])  Sex: Male  : 1990      PCP: JAMESON ADAMES    Center: [facility]     Types of orders made on 2024: Procedure Request    Order Date:2024  Ordering User:GILLIAN MENDEZ [[id number]]  Encounter Provider:Gillian Mendez PA-C [[id number]]  Authorizing Provider: Gillian Mendez PA-C [[id number]]  Supervising Provider:TONYA ALONSO [[id number]]  Type of Supervision:Supervision Required  Department:[facility][[id number]]    Common Order Information  Procedure -> Epidural Injection (specify level) Cmt: L5-S1    Order Specific Information  Order: Procedure Order to Pain Management [Custom: [id number]]  Order #:          [id number]: 1 FUTURE    Priority: Routine  Class: Clinic Performed    Future Order Information      Expires on:2025            Expected by:2024                   Associated Diagnoses      M51.369 Degeneration of intervertebral disc of lumbar region, unspecified       whether pain present      Facility Name: -> [place]           Priority: Routine  Class: Clinic Performed    Future Order Information      Expires on:2025            Expected by:2024                   Associated Diagnoses      M51.369 Degeneration of intervertebral disc of lumbar region, unspecified       whether pain present      Procedure -> Epidural Injection (specify level) Cmt: L5-S1        Facility Name: -> [place]

## 2024-11-07 ENCOUNTER — OFFICE VISIT (OUTPATIENT)
Dept: PAIN MEDICINE | Facility: CLINIC | Age: 34
End: 2024-11-07
Payer: COMMERCIAL

## 2024-11-07 DIAGNOSIS — M54.16 LUMBAR RADICULOPATHY: Primary | ICD-10-CM

## 2024-11-07 DIAGNOSIS — G89.29 CHRONIC RIGHT-SIDED LOW BACK PAIN WITH RIGHT-SIDED SCIATICA: ICD-10-CM

## 2024-11-07 DIAGNOSIS — M54.41 CHRONIC RIGHT-SIDED LOW BACK PAIN WITH RIGHT-SIDED SCIATICA: ICD-10-CM

## 2024-11-07 DIAGNOSIS — M48.061 SPINAL STENOSIS OF LUMBAR REGION, UNSPECIFIED WHETHER NEUROGENIC CLAUDICATION PRESENT: ICD-10-CM

## 2024-11-07 DIAGNOSIS — M51.362 DEGENERATION OF INTERVERTEBRAL DISC OF LUMBAR REGION WITH DISCOGENIC BACK PAIN AND LOWER EXTREMITY PAIN: ICD-10-CM
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
## 2024-11-09 ENCOUNTER — LAB VISIT (OUTPATIENT)
Dept: LAB | Facility: HOSPITAL | Age: 34
End: 2024-11-09
Attending: FAMILY MEDICINE
Payer: COMMERCIAL

## 2024-11-09 DIAGNOSIS — Z11.3 SCREENING FOR STD (SEXUALLY TRANSMITTED DISEASE): ICD-10-CM

## 2024-11-09 DIAGNOSIS — N50.82 SCROTAL PAIN: ICD-10-CM

## 2024-11-09 PROCEDURE — 87798 DETECT AGENT NOS DNA AMP: CPT | Performed by: FAMILY MEDICINE

## 2024-11-09 PROCEDURE — 87563 M. GENITALIUM AMP PROBE: CPT | Performed by: FAMILY MEDICINE

## 2024-11-09 PROCEDURE — 87591 N.GONORRHOEAE DNA AMP PROB: CPT | Performed by: FAMILY MEDICINE

## 2024-11-10 LAB
C TRACH DNA SPEC QL NAA+PROBE: NOT DETECTED
N GONORRHOEA DNA SPEC QL NAA+PROBE: NOT DETECTED

## 2024-11-11 ENCOUNTER — TELEPHONE (OUTPATIENT)
Dept: PAIN MEDICINE | Facility: CLINIC | Age: 34
End: 2024-11-11
Payer: COMMERCIAL

## 2024-11-12 ENCOUNTER — TELEPHONE (OUTPATIENT)
Dept: FAMILY MEDICINE | Facility: CLINIC | Age: 34
End: 2024-11-12
Payer: COMMERCIAL

## 2024-11-12 DIAGNOSIS — M54.41 CHRONIC RIGHT-SIDED LOW BACK PAIN WITH RIGHT-SIDED SCIATICA: ICD-10-CM

## 2024-11-12 DIAGNOSIS — G89.29 CHRONIC RIGHT-SIDED LOW BACK PAIN WITH RIGHT-SIDED SCIATICA: ICD-10-CM

## 2024-11-12 NOTE — TELEPHONE ENCOUNTER
----- Message from Lissa sent at 11/12/2024  9:27 AM CST -----  Contact: pt  Type:  RX Refill Request    Who Called: pt   Refill or New Rx:refill  RX Name and Strength:HYDROcodone-acetaminophen (NORCO) 5-325 mg per tablet  Preferred Pharmacy with phone number:[facility] [place] [facility] - [address]  [address] [place] [address]  Phone: [phone] Fax: [phone]  Local or Mail Order:local  Ordering Provider:Francois  Would the patient rather a call back or a response via [facility]? call  Best Call Back Number:[phone]  Additional Information:   Pt stated he still in pain   Pt requesting a call       Pt is s/c with Dr. Parrish on 11/20/24. Should we look at moving the appt up?

## 2024-11-13 ENCOUNTER — PATIENT MESSAGE (OUTPATIENT)
Dept: RESEARCH | Facility: HOSPITAL | Age: 34
End: 2024-11-13
Payer: COMMERCIAL

## 2024-11-13 DIAGNOSIS — M54.41 CHRONIC RIGHT-SIDED LOW BACK PAIN WITH RIGHT-SIDED SCIATICA: ICD-10-CM

## 2024-11-13 DIAGNOSIS — G89.29 CHRONIC RIGHT-SIDED LOW BACK PAIN WITH RIGHT-SIDED SCIATICA: ICD-10-CM

## 2024-11-13 LAB
M GENITALIUM DNA UR QL NAA+PROBE: NEGATIVE
SPECIMEN SOURCE: NORMAL

## 2024-11-13 RX ORDER — HYDROCODONE BITARTRATE AND ACETAMINOPHEN 5; 325 MG/1; MG/1
1 TABLET ORAL EVERY 12 HOURS PRN
Qty: 20 TABLET | Refills: 0 | Status: CANCELLED | OUTPATIENT
Start: 2024-11-13

## 2024-11-13 RX ORDER — HYDROCODONE BITARTRATE AND ACETAMINOPHEN 5; 325 MG/1; MG/1
1 TABLET ORAL EVERY 12 HOURS PRN
Qty: 20 TABLET | Refills: 0 | Status: SHIPPED | OUTPATIENT
Start: 2024-11-13

## 2024-11-14 LAB
SPECIMEN SOURCE: NORMAL
U PARVUM DNA SPEC QL NAA+PROBE: NEGATIVE
U UREALYTICUM DNA SPEC QL NAA+PROBE: NEGATIVE

## 2024-11-14 NOTE — PRE-PROCEDURE INSTRUCTIONS
Patient reviewed on 11/14/2024.  Okay to proceed at [facility]. The following pre-procedure instructions and arrival time have been reviewed with patient via phone and sent to patient portal for review.  Patient verbalized an understanding.     Dear KINGA,     Please read over the following pre-procedure instructions in it's entirety as there is helpful information here to get you well prepared for your upcoming procedure.     You are scheduled for a procedure with Dr. Velasco on 11/18/2024.     [facility] at the corner of [facility] and [facility]. It is in the [facility] next to [facility]. The address is: [address] [place] [address]. Take the elevator to the 2nd floor.       Registration check in time: 1:50 pm  Scheduled procedure time: 2:50 pm     If you are receiving sedation, you CANNOT drive yourself and must have a responsible friend or family member (no rideshare) to drive you home.        You should take any medications that you routinely take for blood pressure, heart medications, thyroid, cholesterol, etc.      The fasting restrictions are dependent on whether or not you are receiving sedation. Sedation is not available for all procedures.      Your fasting instructions/Sedation type are as follow:  No sedation.  You do not need to fast before this procedure.  You can eat and drink like normal.  You can drive yourself (with stipulations).  There are some rare cases where you may need to call an uber if you are unable to drive after the procedure due to weakness/dizziness.            If you are on blood thinners, you need to follow the anticoagulation instructions that had been discussed previously. You should only stop the blood thinners if it was approved by your primary care physician or your cardiologist. In the event that you are not able to stop your blood thinners, a blood thinner was not listed on your medication list, or we were not able to get clearance from your  cardiologist, then the procedure may have to be postponed/canceled.      IF you were told to stop your blood thinners, this is how long you should generally hold some of the more common ones. Remember that stopping blood thinners is only necessary for certain procedures. If you are unsure of your instructions, please call us.   Aspirin - 5 days  Plavix/Clopidogrel - 7 days  Warfarin / Coumadin - 5 days  Eliquis - 3 days  Pradaxa/Dabigatran - 4 days  Xarelto/Rivaroxaban - 3 days        HOLD all non-insulin injections (shots) until after surgery (Ozempic, Mounjaro, Trulicity, Victoza, Byetta, Wegovy and Adlyxin) (Total of 7 days prior)        If you are a diabetic, do not take your medication if you will be fasting, but bring it with you. Please plan on being here for roughly 2-3 hours.     Please call us if you have been sick (running fever, having any flu-like symptoms) or have been taking ANTIBIOTICS in the past 2 weeks or had any outpatient procedures other than with us (colonoscopy, endoscopy, OBGYN, dental, etc.).      If you have been previously COVID positive, you will need to hold off on your procedure until you are symptom free for 10 days. If you did not have any symptoms, you can have your procedure 10 days from your positive test result.         On the morning of your procedure:  *HOLD ALL VITAMINS, MINERALS, HERBS (INCLUDING HERBAL TEAS) AND SUPPLEMENTS  *SHOWER WITH ANTIBACTERIAL SOAP (EX. DIAL) NIGHT BEFORE AND MORNING OF PROCEDURE  *DO NOT APPLY ANY LOTIONS, OILS, POWDERS, PERFUME/COLOGNE, OINTMENTS, GELS, CREAMS, MAKEUP OR DEODORANT TO YOUR SKIN MORNING OF PROCEDURE  *LEAVE JEWELRY AND ANY VALUABLES AT HOME  *WEAR LOOSE COMFORTABLE CLOTHING         Please reply to this portal message as receipt of delivery.     Thank you,  [facility]  [place], LPN [facility]  Pre-Admit

## 2024-11-18 ENCOUNTER — HOSPITAL ENCOUNTER (OUTPATIENT)
Facility: HOSPITAL | Age: 34
Discharge: HOME | End: 2024-11-18
Attending: STUDENT IN AN ORGANIZED HEALTH CARE EDUCATION/TRAINING PROGRAM | Admitting: STUDENT IN AN ORGANIZED HEALTH CARE EDUCATION/TRAINING PROGRAM
Payer: COMMERCIAL

## 2024-11-18 VITALS
BODY MASS INDEX: 30.34 KG/M2 | OXYGEN SATURATION: 100 % | TEMPERATURE: 98 F | RESPIRATION RATE: 16 BRPM | WEIGHT: 230 LBS | SYSTOLIC BLOOD PRESSURE: 139 MMHG | DIASTOLIC BLOOD PRESSURE: 72 MMHG | HEART RATE: 64 BPM

## 2024-11-18 DIAGNOSIS — G89.29 CHRONIC PAIN: ICD-10-CM

## 2024-11-18 DIAGNOSIS — M54.16 LUMBAR RADICULOPATHY: Primary | ICD-10-CM

## 2024-11-18 PROCEDURE — 63600175 PHARM REV CODE 636 W HCPCS: Performed by: STUDENT IN AN ORGANIZED HEALTH CARE EDUCATION/TRAINING PROGRAM

## 2024-11-18 PROCEDURE — 25500020 PHARM REV CODE 255: Performed by: STUDENT IN AN ORGANIZED HEALTH CARE EDUCATION/TRAINING PROGRAM

## 2024-11-18 PROCEDURE — 62323 NJX INTERLAMINAR LMBR/SAC: CPT | Mod: ,,, | Performed by: STUDENT IN AN ORGANIZED HEALTH CARE EDUCATION/TRAINING PROGRAM

## 2024-11-18 PROCEDURE — 62323 NJX INTERLAMINAR LMBR/SAC: CPT | Performed by: STUDENT IN AN ORGANIZED HEALTH CARE EDUCATION/TRAINING PROGRAM

## 2024-11-18 RX ORDER — LIDOCAINE HYDROCHLORIDE 20 MG/ML
INJECTION, SOLUTION EPIDURAL; INFILTRATION; INTRACAUDAL; PERINEURAL
Status: DISCONTINUED | OUTPATIENT
Start: 2024-11-18 | End: 2024-11-18 | Stop reason: HOSPADM

## 2024-11-18 RX ORDER — DEXAMETHASONE SODIUM PHOSPHATE 10 MG/ML
INJECTION INTRAMUSCULAR; INTRAVENOUS
Status: DISCONTINUED | OUTPATIENT
Start: 2024-11-18 | End: 2024-11-18 | Stop reason: HOSPADM

## 2024-11-18 RX ORDER — ALPRAZOLAM 0.5 MG/1
0.5 TABLET, ORALLY DISINTEGRATING ORAL
Status: DISCONTINUED | OUTPATIENT
Start: 2024-11-18 | End: 2024-11-18 | Stop reason: HOSPADM

## 2024-11-18 NOTE — OP NOTE
Lumbar Interlaminar Epidural Steroid Injection under Fluoroscopic Guidance    The procedure, risks, benefits, and options were discussed with the patient. There are no contraindications to the procedure. The patent expressed understanding and agreed to the procedure. Informed written consent was obtained prior to the start of the procedure and can be found in the patient's chart.    PATIENT NAME: Ze Toscano   MRN: [id number]     DATE OF PROCEDURE: 11/18/2024    PROCEDURE: Lumbar Interlaminar Epidural Steroid Injection L5/S1 under Fluoroscopic Guidance    PRE-OP DIAGNOSIS: Lumbar radiculopathy [M54.16] Lumbar radiculopathy [M54.16]    POST-OP DIAGNOSIS: Same    PHYSICIAN: Natali Velasco DO    ASSISTANTS: None     MEDICATIONS INJECTED: Preservative-free Decadron 10mg with 4cc of Lidocaine 1% MPF and preservative free normal saline    LOCAL ANESTHETIC INJECTED: Xylocaine 2%     SEDATION: None    ESTIMATED BLOOD LOSS: None    COMPLICATIONS: None    TECHNIQUE: Time-out was performed to identify the patient and procedure to be performed. With the patient laying in a prone position, the surgical area was prepped and draped in the usual sterile fashion using ChloraPrep and a fenestrated drape. The level was determined under fluoroscopy guidance. Skin anesthesia was achieved by injecting Lidocaine 2% over the injection site. The interlaminar space was then approached with a 18 gauge,  3.5 inch Tuohy needle that was introduced under fluoroscopic guidance in the AP, lateral and/or contralateral oblique imaging. Once the Ligamentum flavum was encountered loss of resistance to saline was used to enter the epidural space. With positive loss of resistance and negative aspiration for CSF or Blood, contrast dye Omnipaque (300mg/mL) was injected to confirm placement and there was no vascular runoff. 5 mL of the medication mixture listed above was injected slowly. Displacement of the radio opaque contrast after injection of the  medication confirmed that the medication went into the area of the epidural space. The needles were removed and bleeding was nil. A sterile dressing was applied. No specimens collected. The patient tolerated the procedure well.     The patient was monitored after the procedure in the recovery area. They were given post-procedure and discharge instructions to follow at home. The patient was discharged in a stable condition.      Natali Velasco DO

## 2024-11-18 NOTE — PLAN OF CARE
Patient discharge instructions reviewed, patient verbalized understanding. Ambulated tot  waiting room with steady gait,   No concerns voiced.

## 2024-11-18 NOTE — DISCHARGE SUMMARY
Discharge Note  Short Stay      SUMMARY     Admit Date: 11/18/2024    Attending Physician: Natali Velasco      Discharge Physician: Natali Velasco      Discharge Date: 11/18/2024 3:38 PM    Procedure(s) (LRB):  L5-S1 ELLI (N/A)    Final Diagnosis: Lumbar radiculopathy [M54.16]    Disposition: Home or self care    Patient Instructions:   Current Discharge Medication List        CONTINUE these medications which have NOT CHANGED    Details   albuterol (PROVENTIL) 2.5 mg /3 mL (0.083 %) nebulizer solution Take 3 mLs (2.5 mg total) by nebulization every 6 (six) hours as needed for Wheezing. Rescue  Qty: 20 mL, Refills: 2    Associated Diagnoses: Mild intermittent asthma without complication      albuterol (PROVENTIL/VENTOLIN HFA) 90 mcg/actuation inhaler INHALE 2 PUFFS BY MOUTH EVERY 6 HOURS AS NEEDED FOR WHEEZING ,  RESCUE  Qty: 7 g, Refills: 0    Associated Diagnoses: Mild intermittent asthma without complication      baclofen (LIORESAL) 10 MG tablet Take 1 tablet (10 mg total) by mouth 3 (three) times daily.  Qty: 90 tablet, Refills: 0      BREYNA 80-4.5 mcg/actuation HFAA INHALE 2 PUFFS BY MOUTH ONCE DAILY .  CONTROLLER  Qty: 11 g, Refills: 0    Associated Diagnoses: Mild intermittent asthma without complication      cyclobenzaprine (FLEXERIL) 10 MG tablet Take 1 tablet (10 mg total) by mouth 3 (three) times daily as needed for Muscle spasms.  Qty: 30 tablet, Refills: 1    Associated Diagnoses: Muscle spasm      meloxicam (MOBIC) 15 MG tablet Take 1 tablet (15 mg total) by mouth once daily.  Qty: 30 tablet, Refills: 0      methylPREDNISolone (MEDROL DOSEPACK) 4 mg tablet use as directed  Qty: 1 each, Refills: 0      ondansetron (ZOFRAN-ODT) 4 MG TbDL Take 1 tablet (4 mg total) by mouth 2 (two) times daily.  Qty: 30 tablet, Refills: 2    Associated Diagnoses: Nausea      HYDROcodone-acetaminophen (NORCO) 5-325 mg per tablet Take 1 tablet by mouth every 12 (twelve) hours as needed for Pain.  Qty: 20 tablet,  Refills: 0    Comments: Quantity prescribed more than 7 day supply? No  Associated Diagnoses: Chronic right-sided low back pain with right-sided sciatica      influenza (FLUARIX TRIV [id number], PF,) 45 mcg (15 mcg x 3)/0.5 mL IM vaccine (> or = 6 mo) Inject into the muscle.  Qty: 0.5 mL, Refills: 0      ketoconazole (NIZORAL) 2 % shampoo Apply topically twice a week.  Qty: 120 mL, Refills: 2    Associated Diagnoses: Tinea versicolor      ondansetron (ZOFRAN) 8 MG tablet Take 1 tablet (8 mg total) by mouth every 8 (eight) hours as needed for Nausea.  Qty: 30 tablet, Refills: 1    Associated Diagnoses: Nausea                 Discharge Diagnosis: Lumbar radiculopathy [M54.16]  Condition on Discharge: Stable with no complications to procedure   Diet on Discharge: Same as before.  Activity: as per instruction sheet.  Discharge to: Home with a responsible adult.  Follow up: 2-4 weeks       Please call my office or pager at [phone] if experienced any weakness or loss of sensation, fever > 101.5, pain uncontrolled with oral medications, persistent nausea/vomiting/or diarrhea, redness or drainage from the incisions, or any other worrisome concerns. If physician on call was not reached or could not communicate with our office for any reason please go to the nearest emergency department

## 2024-11-18 NOTE — DISCHARGE INSTRUCTIONS
[facility]  Dr. Natali Velasco  Messaging service # [phone]    POST-PROCEDURE INSTRUCTIONS:    Today you had an injection that included a steroid medications.  The steroid may or may not have been mixed with a local anesthetic when it was injected.   If the injection was in the neck, you may feel some pressure, numbness, or slight weakness in the arm after the procedure for a short period of time (this is a normal response), if this persists for longer than 1 day please contact our office or go to the emergency room.  If the injection was in the low back, you may feel some pressure, numbness, or slight weakness in the leg after the procedure for a short period of time (this is a normal response), if this persists for longer than 1 day please contact our office or go to the emergency room.  You may get side effects from the steroid.  This is not uncommon.  Symptoms include: elevated blood sugar, elevated blood pressure, headache, flushing, nausea, insomnia.  These symptoms are transient and will resolve within 1-3 days.  If symptoms last longer than this please contact our office or head to the emergency room.  Steroid medications can take anywhere from 3-14 days to take effect (rarely longer).  You may notice that your pain worsens for a short period of time after the injection, this would not be unusual due to the pressure and trauma from the needle.    If you do not have a follow up appointment scheduled, please contact my office (or the office of the physician who referred you for the procedure) to get a post-procedure follow up scheduled 2-4 weeks after the procedure.  This can be done as a virtual visit if that is more convenient for you.      What you need to do:    Keep a record of your response to the injection you had today.    How much relief did you get?   When did the relief start and how long did it last?  Were you able to decrease the use of any of your pain  medications?  Were you able to increase your level of activity?  How long did the relief last?    What to watch out for:    If you experience any of the following symptoms after your procedure, please notify the messaging service immediately (see above for contact information):   fever (increased oral temperature)   bleeding or swelling at the injection site,    drainage, rash or redness at the injection site    possible signs of infection    increased pain at the injection site   worsening of your usual pain   severe headache   new or worsening numbness    new arm and/or leg weakness, or    changes in bowel and/or bladder function: urinating or defecating on yourself and not knowing that you did it.    PLEASE FOLLOW ALL INSTRUCTIONS CAREFULLY     Do not engage in strenuous activity (e.g., lifting or pushing heavy objects or repeated bending) for 24 hours.     Do not take a bath, swim or use Jacuzzi for 24 hours after procedure. (A shower is fine).   Remove any Band-Aids when you get home.    Use cold/ice, as needed for comfort.  We recommend the use of cold therapy alternating on for 20 minutes, off for 20 minutes.    Do not apply direct heat (heating pad or heat packs) to the injection site for 24 hours.     Resume your usual medications, unless instructed otherwise by your Pain Physician.     If you are on warfarin (Coumadin) or other blood thinner, resume this medication as instructed by your prescribing Physician.    IF AT ANY POINT YOU ARE VERY CONCERNED ABOUT YOUR SYMPTOMS, PLEASE GO TO THE EMERGENCY ROOM.    If you develop worsening pain, weakness, numbness, lose bowel or bladder control (i.e., having an accident where you did not even know you had to go to the bathroom and suddenly noticed you soiled yourself), saddle anesthesia (a loss of sensation restricted to the area of the buttocks, anus and between the legs -- i.e., those parts of your body that would touch a saddle if you were sitting on one) you  need to go immediately to the emergency department for evaluation and treatment.    ----------------------------------------------------------------------------------------------------------------------------------------------------------------  If you received Sedation please read the following instructions:  POST SEDATION INSTRUCTIONS    Today you received intravenous medication (also known as sedation) that was used to help you relax and/or decrease discomfort during your procedure. This medication will be acting in your body for the next 24 hours, so you might feel a little tired or sleepy. This feeling will slowly wear off.   Common side effects associated with these medications include: drowsiness, dizziness, sleepiness, confusion, feeling excited, difficulty remembering things, lack of steadiness with walking or balance, loss of fine muscle control, slowed reflexes, difficulty focusing, and blurred vision.  Some over-the-counter and prescription medications (e.g., muscle relaxants, opioids, mood-altering medications, sedatives/hypnotics, antihistamines) can interact with the intravenous medication you received and cause an increased risk of the side effects listed above in addition to other potentially life threatening side effects. Use extreme caution if you are taking such medications, and consult with your Pain Physician or prescribing physician if you have any questions.  For the next 12-24 hours:    DO NOT--Drive a car, operate machinery or power tools   DO NOT--Drink any alcoholic beverages (not even beer), they may dangerously increase the risk of side effects.    DO NOT--Make any important legal or business decisions or sign important documents.  We advise you to have someone to assist you at home. Move slowly and carefully. Do not make sudden changes in position. Be aware of dizziness or light-headedness and move accordingly.   If you seek medical treatment within 24 hours, let the nurse or doctor  caring for you know that you have received the above medications. If you have any questions or concerns related to your sedation or treatment today please contact us.

## 2024-11-19 ENCOUNTER — TELEPHONE (OUTPATIENT)
Dept: PAIN MEDICINE | Facility: CLINIC | Age: 34
End: 2024-11-19
Payer: COMMERCIAL

## 2024-11-20 ENCOUNTER — OFFICE VISIT (OUTPATIENT)
Dept: FAMILY MEDICINE | Facility: CLINIC | Age: 34
End: 2024-11-20
Payer: COMMERCIAL

## 2024-11-20 DIAGNOSIS — M54.16 LUMBAR RADICULOPATHY: ICD-10-CM

## 2024-11-20 DIAGNOSIS — N50.3 EPIDIDYMAL CYST: ICD-10-CM

## 2024-11-20 DIAGNOSIS — N50.82 SCROTAL PAIN: Primary | ICD-10-CM

## 2024-11-20 DIAGNOSIS — G89.29 OTHER CHRONIC PAIN: ICD-10-CM

## 2024-11-20 PROCEDURE — 99214 OFFICE O/P EST MOD 30 MIN: CPT | Mod: 95,,, | Performed by: FAMILY MEDICINE

## 2024-11-20 PROCEDURE — 3044F HG A1C LEVEL LT 7.0%: CPT | Mod: CPTII,95,, | Performed by: FAMILY MEDICINE

## 2024-11-20 NOTE — PROGRESS NOTES
The patient location is: [place]  The chief complaint leading to consultation is: F?u of back and scrotal pain    Visit type: audiovisual    Face to Face time with patient: 10 mins    30 minutes of total time spent on the encounter, which includes face to face time and non-face to face time preparing to see the patient (eg, review of tests), Obtaining and/or reviewing separately obtained history, Documenting clinical information in the electronic or other health record, Independently interpreting results (not separately reported) and communicating results to the patient/family/caregiver, or Care coordination (not separately reported).     Each patient to whom he or she provides medical services by telemedicine is:  (1) informed of the relationship between the physician and patient and the respective role of any other health care provider with respect to management of the patient; and (2) notified that he or she may decline to receive medical services by telemedicine and may withdraw from such care at any time.      (Portions of this note were dictated using voice recognition software and may contain dictation related errors in spelling/grammar/syntax not found on text review)      HPI: 34 y.o. male presented for follow up. He has medical history significant for allergic rhinitis, mild intermittent asthma, right knee pain, chronic low back pain. Last seen 10/29     Interval Hx    Patient has history of remote back injury and suffered from chronic low back pain sec to disc herniation previously in 2015, had flare-up from time to time, previous workup related to back pain including imaging, physical therapy, epidural injections, pain medications were in [place].     He reports that last week on 10/24 he was doing decompression exercises in the gym when all of a sudden he started to have low back pain due to doing stretching exercises.  Stated that pain was intense, described as lock ness and tightness in the  lower back on both side but more on the right side, he started taking muscle relaxants, Flexeril, which was refilled on last office visit along with over-the-counter nonsteroidal medications for the pain, stated that it was not touching the pain and symptoms have been progressively worsening, he was unable to do activities of daily living and unable to bend due to constant pain.  He works as a dialysis nurse.     He made appointment with the orthopedic for evaluation, was evaluated yesterday, he was given Medrol Dosepak along with baclofen.  X-ray of the lumbar spine was done and MRI has been scheduled.     Patient stated that medications have not been enough to control his pain, he has requested prescription of opioid pain medication, was not refilled by Orthopedic and was advised to follow up with PCP to get the script.         Norco was prescribed for him and appointment with pain management was scheduled    He was seen by pain clinic on 11/7, had ELLI and since then pain has been getting better, he is happy about the results.    He has epididymal cyst, has been having scrotal pain with radiation towards the groin area, was seen by Urology, ultrasound of the scrotum showed small cyst, patient stated that symptoms have been progressively worsening and he continues to have scrotal pain and also feels cyst has increased in size.  He is sexually active with 1 partner in the last several years, denies having any urethral discharge. He would like another ultrasound and urology referral.    No other concerns.       Past Medical History:   Diagnosis Date    Allergy     Asthma     Right knee pain        Past Surgical History:   Procedure Laterality Date    APPENDECTOMY  2005    EPIDURAL STEROID INJECTION INTO LUMBAR SPINE N/A 11/18/2024    Procedure: L5-S1 ELLI;  Surgeon: Natali Velasco DO;  Location: [facility];  Service: Pain Management;  Laterality: N/A;  no AC    ESOPHAGOGASTRODUODENOSCOPY N/A 08/29/2023     Procedure: EGD (ESOPHAGOGASTRODUODENOSCOPY);  Surgeon: Glen Parada MD;  Location: [facility];  Service: Endoscopy;  Laterality: N/A;    WISDOM TOOTH EXTRACTION         Family History   Problem Relation Name Age of Onset    Gallbladder disease Mother      Diabetes Father Ney, adopted     Mitral valve prolapse Father Ney, adopted     Neuropathy Father Ney, adopted     Emphysema Maternal Grandmother      Emphysema Maternal Grandfather         Social History     Tobacco Use    Smoking status: Never    Smokeless tobacco: Never   Substance Use Topics    Alcohol use: Yes     Alcohol/week: 10.0 standard drinks of alcohol     Types: 10 Glasses of wine per week     Comment: social drinker    Drug use: Never       Lab Results   Component Value Date    WBC 5.56 07/11/2024    HGB 14.5 07/11/2024    HCT 42.5 07/11/2024    MCV 92 07/11/2024     07/11/2024    CHOL 203 (H) 07/11/2024    TRIG 104 07/11/2024    HDL 56 07/11/2024    ALT 32 07/11/2024    AST 22 07/11/2024    BILITOT 0.6 07/11/2024    ALKPHOS 78 07/11/2024     (L) 07/11/2024    K 4.2 07/11/2024     07/11/2024    CREATININE 1.2 07/11/2024    CALCIUM 8.9 07/11/2024    ALBUMIN 3.9 07/11/2024    BUN 18 07/11/2024    CO2 22 (L) 07/11/2024    TSH 2.545 07/11/2024    PSA 0.25 09/01/2023    HGBA1C 5.4 07/11/2024    LDLCALC 126.2 07/11/2024     07/11/2024    [id number] 86 09/01/2023             Vital signs reviewed  PE:   APPEARANCE: Well nourished, well developed, in no acute distress.    HEAD: Normocephalic, atraumatic.  EXTREMITIES: No edema, cyanosis, or clubbing.    Review of Systems   Constitutional:  Negative for activity change, chills, fatigue and unexpected weight change.   HENT:  Negative for hearing loss, rhinorrhea and trouble swallowing.    Eyes:  Negative for discharge and visual disturbance.   Respiratory:  Negative for chest tightness and wheezing.    Cardiovascular:  Negative for chest pain and palpitations.    Gastrointestinal:  Negative for blood in stool, constipation, diarrhea and vomiting.   Endocrine: Negative for polydipsia and polyuria.   Genitourinary:  Negative for difficulty urinating, hematuria and urgency.   Musculoskeletal:  Negative for arthralgias, joint swelling and neck pain.   Neurological:  Negative for weakness and headaches.   Psychiatric/Behavioral:  Negative for confusion and dysphoric mood.        IMPRESSION  1. Scrotal pain    2. Epididymal cyst    3. Lumbar radiculopathy    4. Other chronic pain            PLAN      1. Scrotal pain (Primary)    - US Scrotum And Testicles; Future    - Ambulatory referral/consult to Urology; Future      2. Epididymal cyst    - Ambulatory referral/consult to Urology; Future      3. Lumbar radiculopathy    4. Chronic pain     Followed by pain management and orthopedics    Advised physical activity with gentle stretching        SCREENINGS        Age/demographic appropriate health maintenance:    Health Maintenance Due   Topic Date Due    COVID-19 Vaccine (2 - 2024-25 season) 09/01/2024           Spent adequate time in obtaining history and explaining differentials     35 minutes spent during this visit of which greater than 50% devoted to face-face counseling and coordination of care regarding diagnosis and management plan       Torres Parrish   11/20/2024

## 2025-01-07 ENCOUNTER — HOSPITAL ENCOUNTER (OUTPATIENT)
Dept: RADIOLOGY | Facility: HOSPITAL | Age: 35
Discharge: HOME | End: 2025-01-07
Attending: FAMILY MEDICINE
Payer: COMMERCIAL

## 2025-01-07 DIAGNOSIS — N50.82 SCROTAL PAIN: ICD-10-CM

## 2025-01-07 PROCEDURE — 76870 US EXAM SCROTUM: CPT | Mod: TC

## 2025-01-07 PROCEDURE — 76870 US EXAM SCROTUM: CPT | Mod: 26,,, | Performed by: RADIOLOGY

## 2025-01-28 DIAGNOSIS — M62.838 MUSCLE SPASM: ICD-10-CM

## 2025-01-28 DIAGNOSIS — J45.20 MILD INTERMITTENT ASTHMA WITHOUT COMPLICATION: ICD-10-CM

## 2025-01-28 RX ORDER — CYCLOBENZAPRINE HCL 10 MG
10 TABLET ORAL 3 TIMES DAILY PRN
Qty: 30 TABLET | Refills: 1 | Status: SHIPPED | OUTPATIENT
Start: 2025-01-28 | End: 2025-01-28 | Stop reason: SDUPTHER

## 2025-01-28 NOTE — TELEPHONE ENCOUNTER
----- Message from Malena sent at 1/28/2025 10:39 AM CST -----  Type: RX Refill Request    Who Called: pt     Have you contacted your pharmacy:    Refill or New Rx:cyclobenzaprine (FLEXERIL) 10 MG tablet    albuterol (PROVENTIL/VENTOLIN HFA) 90 mcg/actuation inhaler        RX Name and Strength:    How is the patient currently taking it? (ex. 1XDay):    Is this a 30 day or 90 day RX:    Preferred Pharmacy with phone number:  [facility] - [place] - [address]  [address] [place] [address]  Phone: [phone] Fax: [phone]        Local or Mail Order:    Ordering Provider:    Would the patient rather a call back or a response via [facility]? call    Best Call Back Number:[phone] (home)       Additional Information:

## 2025-01-29 RX ORDER — CYCLOBENZAPRINE HCL 10 MG
10 TABLET ORAL 3 TIMES DAILY PRN
Qty: 30 TABLET | Refills: 1 | Status: SHIPPED | OUTPATIENT
Start: 2025-01-29

## 2025-01-29 RX ORDER — BUDESONIDE AND FORMOTEROL FUMARATE DIHYDRATE 80; 4.5 UG/1; UG/1
2 AEROSOL RESPIRATORY (INHALATION) DAILY
Qty: 11 G | Refills: 2 | Status: SHIPPED | OUTPATIENT
Start: 2025-01-29

## 2025-02-14 ENCOUNTER — OFFICE VISIT (OUTPATIENT)
Dept: PAIN MEDICINE | Facility: CLINIC | Age: 35
End: 2025-02-14
Payer: COMMERCIAL

## 2025-02-14 ENCOUNTER — TELEPHONE (OUTPATIENT)
Dept: PAIN MEDICINE | Facility: CLINIC | Age: 35
End: 2025-02-14
Payer: COMMERCIAL

## 2025-02-14 DIAGNOSIS — M54.16 LUMBAR RADICULOPATHY: Primary | ICD-10-CM

## 2025-02-14 DIAGNOSIS — M51.16 LUMBAR DISC DISEASE WITH RADICULOPATHY: ICD-10-CM

## 2025-02-14 NOTE — TELEPHONE ENCOUNTER
----- Message from Melida Velasco sent at 2025 10:11 AM CST -----  Regarding: Order for KINGA VALERO    Patient Name: KINGA VALERO([id number])  Sex: Male  : 1990      PCP: JAMESON ADAMES    Center: [facility]     Types of orders made on 2025: Procedure Request    Order Date:2025  Ordering User:MELIDA VELASCO [[id number]]  Encounter Provider:Melida Velasco DO [[id number]]  Authorizing Provider: Melida Velasco DO [[id number]]  Department:[facility] PAIN MANAGEMENT[[id number]]    Common Order Information  Procedure -> Epidural Injection (specify level) Cmt: L5/S1    Pre-op Diagnosis -> Lumbar disc disease with radiculopathy     Order Specific Information  Order: Procedure Order to Pain Management [Custom: [id number]]  Order #:          [id number]: 1 FUTURE    Priority: Routine  Class: Clinic Performed    Future Order Information      Expires on:2026            Expected by:2025                   Associated Diagnoses      M54.16 Lumbar radiculopathy      M51.16 Lumbar disc disease with radiculopathy      Physician -> Gelter         Is patient on anti-coagulants? -> No         Facility Name: -> [place]           Priority: Routine  Class: Clinic Performed    Future Order Information      Expires on:2026            Expected by:2025                   Associated Diagnoses      M54.16 Lumbar radiculopathy      M51.16 Lumbar disc disease with radiculopathy      Procedure -> Epidural Injection (specify level) Cmt: L5/S1        Physician -> Gelter         Is patient on anti-coagulants? -> No         Pre-op Diagnosis -> Lumbar disc disease with radiculopathy         Facility Name: -> [place]
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
## 2025-02-14 NOTE — TELEPHONE ENCOUNTER
----- Message from Melida Velasco sent at 2025 10:11 AM CST -----  Regarding: Order for KINGA VALERO    Patient Name: KINGA VALERO([id number])  Sex: Male  : 1990      PCP: JAMESON ADAMES    Center: [facility]     Types of orders made on 2025: Procedure Request    Order Date:2025  Ordering User:MELIDA VELASCO [[id number]]  Encounter Provider:Melida Velasco DO [[id number]]  Authorizing Provider: Melida Velasco DO [[id number]]  Department:[facility] PAIN MANAGEMENT[[id number]]    Common Order Information  Procedure -> Epidural Injection (specify level) Cmt: L5/S1    Pre-op Diagnosis -> Lumbar disc disease with radiculopathy     Order Specific Information  Order: Procedure Order to Pain Management [Custom: [id number]]  Order #:          [id number]: 1 FUTURE    Priority: Routine  Class: Clinic Performed    Future Order Information      Expires on:2026            Expected by:2025                   Associated Diagnoses      M54.16 Lumbar radiculopathy      M51.16 Lumbar disc disease with radiculopathy      Physician -> Gelter         Is patient on anti-coagulants? -> No         Facility Name: -> [place]           Priority: Routine  Class: Clinic Performed    Future Order Information      Expires on:2026            Expected by:2025                   Associated Diagnoses      M54.16 Lumbar radiculopathy      M51.16 Lumbar disc disease with radiculopathy      Procedure -> Epidural Injection (specify level) Cmt: L5/S1        Physician -> Gelter         Is patient on anti-coagulants? -> No         Pre-op Diagnosis -> Lumbar disc disease with radiculopathy         Facility Name: -> [place]

## 2025-02-18 ENCOUNTER — TELEPHONE (OUTPATIENT)
Dept: PAIN MEDICINE | Facility: CLINIC | Age: 35
End: 2025-02-18
Payer: COMMERCIAL

## 2025-02-21 NOTE — PRE-PROCEDURE INSTRUCTIONS
Patient reviewed on 2/21/2025.  Okay to proceed at [facility]. The following pre-procedure instructions and arrival time have been reviewed with patient via phone and sent to patient portal for review.  Patient verbalized an understanding.      Dear KINGA,        Please read over the following pre-procedure instructions in it's entirety as there is helpful information here to get you well prepared for your upcoming procedure.     You are scheduled for a procedure with Dr. Sullivan on 2/25/25.     [facility] is located at the corner of [facility] and [facility]. It is in the [place] next to [facility]. The address is: [address] [place] [address]. Take the elevator to the 2nd floor.      Registration check in time: 7:00 am  Scheduled procedure time: 8:00 am     You are scheduled to receive:_______Oral sedation                                                                 _______IV Sedation                                                                 ___X___ No Sedation                                                                                           If you are receiving any sedation, you CANNOT drive yourself and must have a responsible friend or family member (no rideshare) to drive you home.     You should take any medications that you routinely take for blood pressure, heart medications, thyroid, cholesterol, etc.      *The fasting restrictions are dependent on whether or not you are receiving sedation. Sedation is not available for all procedures.      Your fasting instructions for sedation patients are as follow:  IV sedation. Nothing to eat after midnight the night prior to procedure. Patients are encouraged to consume clear liquids up to 2 hours prior to scheduled arrival time. -Clear liquids include Gatorade, water, soda, black coffee or tea (no milk or creamer), and clear juices. - Clear liquids do NOT include anything with pulp or food particles (chicken broth, ice  cream, yogurt, Jello, etc.) You CANNOT drive yourself and must have a .            If you are on blood thinners, you need to follow the anticoagulation instructions that had been discussed previously. You should only stop the blood thinners if it was approved by your primary care physician or your cardiologist. In the event that you are not able to stop your blood thinners, a blood thinner was not listed on your medication list, or we were not able to get clearance from your cardiologist, then the procedure may have to be postponed/canceled.      IF you were told to stop your blood thinners, this is how long you should generally hold some of the more common ones. Remember that stopping blood thinners is only necessary for certain procedures. If you are unsure of your instructions, please call us.   Aspirin - 5 days  Plavix/Clopidogrel - 7 days  Warfarin / Coumadin - 5 days  Eliquis - 3 days  Pradaxa/Dabigatran - 4 days  Xarelto/Rivaroxaban - 3 days        HOLD all non-insulin injections (shots) until after surgery (Semaglutide, Tirzepatide, Ozempic, Mounjaro, Trulicity, Victoza, Byetta, Wegovy and Adlyxin) (Total of 7 days prior)        If you are a diabetic, do not take your medication if you will be fasting, but bring it with you. Please plan on being here for roughly 2-3 hours. Please note that most procedures will not be performed if you blood sugar is >200.     Please call us if you have been sick (running fever, having any flu-like symptoms) or have been taking ANTIBIOTICS in the past 2 weeks or had any outpatient procedures other than with us (colonoscopy, endoscopy, OBGYN, dental, etc.).      If you have been previously COVID positive, you will need to hold off on your procedure until you are symptom free for 10 days. If you did not have any symptoms, you can have your procedure 10 days from your positive test result.         On the morning of your procedure:  *HOLD ALL VITAMINS, MINERALS, HERBS  (INCLUDING HERBAL TEAS) AND SUPPLEMENTS  *SHOWER WITH ANTIBACTERIAL SOAP (EX. DIAL) NIGHT BEFORE AND MORNING OF PROCEDURE  *DO NOT APPLY ANY LOTIONS, OILS, POWDERS, PERFUME/COLOGNE, OINTMENTS, GELS, CREAMS, MAKEUP OR DEODORANT TO YOUR SKIN MORNING OF PROCEDURE  *LEAVE JEWELRY AND ANY VALUABLES AT HOME  *WEAR LOOSE COMFORTABLE CLOTHING      In the event that you are running late or need to reschedule on the day of your procedure, please contact the pre-op desk at [phone].          Please reply to this portal message as receipt of delivery.     Thank you,  [facility] &  Denise, EILEENN  [facility]  Pre-Admit

## 2025-02-24 NOTE — DISCHARGE INSTRUCTIONS
[facility] [place]  Dr. Nacho Sullivan  Messaging service # [phone]    POST-PROCEDURE INSTRUCTIONS:    Today you had an injection that included a steroid medications.  The steroid may or may not have been mixed with a local anesthetic when it was injected.   If the injection was in the neck, you may feel some pressure, numbness, or slight weakness in the arm after the procedure for a short period of time (this is a normal response), if this persists for longer than 1 day please contact our office or go to the emergency room.  If the injection was in the low back, you may feel some pressure, numbness, or slight weakness in the leg after the procedure for a short period of time (this is a normal response), if this persists for longer than 1 day please contact our office or go to the emergency room.  You may get side effects from the steroid.  This is not uncommon.  Symptoms include: elevated blood sugar, elevated blood pressure, headache, flushing, nausea, insomnia.  These symptoms are transient and will resolve within 1-3 days.  If symptoms last longer than this please contact our office or head to the emergency room.  Steroid medications can take anywhere from 3-14 days to take effect (rarely longer).  You may notice that your pain worsens for a short period of time after the injection, this would not be unusual due to the pressure and trauma from the needle.    If you do not have a follow up appointment scheduled, please contact my office (or the office of the physician who referred you for the procedure) to get a post-procedure follow up scheduled 2-4 weeks after the procedure.  This can be done as a virtual visit if that is more convenient for you.      What you need to do:    Keep a record of your response to the injection you had today.    How much relief did you get?   When did the relief start and how long did it last?  Were you able to decrease the use of any of your pain  medications?  Were you able to increase your level of activity?  How long did the relief last?    What to watch out for:    If you experience any of the following symptoms after your procedure, please notify the messaging service immediately (see above for contact information):   fever (increased oral temperature)   bleeding or swelling at the injection site,    drainage, rash or redness at the injection site    possible signs of infection    increased pain at the injection site   worsening of your usual pain   severe headache   new or worsening numbness    new arm and/or leg weakness, or    changes in bowel and/or bladder function: urinating or defecating on yourself and not knowing that you did it.    PLEASE FOLLOW ALL INSTRUCTIONS CAREFULLY     Do not engage in strenuous activity (e.g., lifting or pushing heavy objects or repeated bending) for 24 hours.     Do not take a bath, swim or use Jacuzzi for 24 hours after procedure. (A shower is fine).   Remove any Band-Aids when you get home.    Use cold/ice, as needed for comfort.  We recommend the use of cold therapy alternating on for 20 minutes, off for 20 minutes.    Do not apply direct heat (heating pad or heat packs) to the injection site for 24 hours.     Resume your usual medications, unless instructed otherwise by your Pain Physician.     If you are on warfarin (Coumadin) or other blood thinner, resume this medication as instructed by your prescribing Physician.    IF AT ANY POINT YOU ARE VERY CONCERNED ABOUT YOUR SYMPTOMS, or you have an urgent or emergent issue after between 5pm and 7am or on weekends, please contact  the on call provider at [phone].    If you develop worsening pain, weakness, numbness, lose bowel or bladder control (i.e., having an accident where you did not even know you had to go to the bathroom and suddenly noticed you soiled yourself), saddle anesthesia (a loss of sensation restricted to the area of the buttocks, anus and between  the legs -- i.e., those parts of your body that would touch a saddle if you were sitting on one) you need to go immediately to the emergency department for evaluation and treatment.    ----------------------------------------------------------------------------------------------------------------------------------------------------------------  If you received Sedation please read the following instructions:  POST SEDATION INSTRUCTIONS    Today you received intravenous medication (also known as sedation) that was used to help you relax and/or decrease discomfort during your procedure. This medication will be acting in your body for the next 24 hours, so you might feel a little tired or sleepy. This feeling will slowly wear off.   Common side effects associated with these medications include: drowsiness, dizziness, sleepiness, confusion, feeling excited, difficulty remembering things, lack of steadiness with walking or balance, loss of fine muscle control, slowed reflexes, difficulty focusing, and blurred vision.  Some over-the-counter and prescription medications (e.g., muscle relaxants, opioids, mood-altering medications, sedatives/hypnotics, antihistamines) can interact with the intravenous medication you received and cause an increased risk of the side effects listed above in addition to other potentially life threatening side effects. Use extreme caution if you are taking such medications, and consult with your Pain Physician or prescribing physician if you have any questions.  For the next 12-24 hours:    DO NOT--Drive a car, operate machinery or power tools   DO NOT--Drink any alcoholic beverages (not even beer), they may dangerously increase the risk of side effects.    DO NOT--Make any important legal or business decisions or sign important documents.  We advise you to have someone to assist you at home. Move slowly and carefully. Do not make sudden changes in position. Be aware of dizziness or  light-headedness and move accordingly.   If you seek medical treatment within 24 hours, let the nurse or doctor caring for you know that you have received the above medications. If you have any questions or concerns related to your sedation or treatment today please contact us.

## 2025-02-25 ENCOUNTER — HOSPITAL ENCOUNTER (OUTPATIENT)
Facility: HOSPITAL | Age: 35
Discharge: HOME | End: 2025-02-25
Attending: EMERGENCY MEDICINE | Admitting: EMERGENCY MEDICINE
Payer: COMMERCIAL

## 2025-02-25 VITALS
RESPIRATION RATE: 16 BRPM | OXYGEN SATURATION: 100 % | HEART RATE: 71 BPM | TEMPERATURE: 98 F | DIASTOLIC BLOOD PRESSURE: 86 MMHG | SYSTOLIC BLOOD PRESSURE: 152 MMHG

## 2025-02-25 DIAGNOSIS — G89.29 CHRONIC PAIN: ICD-10-CM

## 2025-02-25 PROCEDURE — 63600175 PHARM REV CODE 636 W HCPCS: Performed by: EMERGENCY MEDICINE

## 2025-02-25 PROCEDURE — 25500020 PHARM REV CODE 255: Performed by: EMERGENCY MEDICINE

## 2025-02-25 PROCEDURE — 62323 NJX INTERLAMINAR LMBR/SAC: CPT | Mod: ,,, | Performed by: EMERGENCY MEDICINE

## 2025-02-25 PROCEDURE — 62323 NJX INTERLAMINAR LMBR/SAC: CPT | Performed by: EMERGENCY MEDICINE

## 2025-02-25 RX ORDER — LIDOCAINE HYDROCHLORIDE 20 MG/ML
INJECTION, SOLUTION EPIDURAL; INFILTRATION; INTRACAUDAL; PERINEURAL
Status: DISCONTINUED | OUTPATIENT
Start: 2025-02-25 | End: 2025-02-25 | Stop reason: HOSPADM

## 2025-02-25 RX ORDER — ALPRAZOLAM 0.5 MG/1
0.5 TABLET, ORALLY DISINTEGRATING ORAL ONCE AS NEEDED
Status: DISCONTINUED | OUTPATIENT
Start: 2025-02-25 | End: 2025-02-25 | Stop reason: HOSPADM

## 2025-02-25 RX ORDER — DEXAMETHASONE SODIUM PHOSPHATE 10 MG/ML
INJECTION, SOLUTION INTRA-ARTICULAR; INTRALESIONAL; INTRAMUSCULAR; INTRAVENOUS; SOFT TISSUE
Status: DISCONTINUED | OUTPATIENT
Start: 2025-02-25 | End: 2025-02-25 | Stop reason: HOSPADM

## 2025-02-25 NOTE — OP NOTE
Lumbar Interlaminar Epidural Steroid Injection under Fluoroscopic Guidance    The procedure, risks, benefits, and options were discussed with the patient. There are no contraindications to the procedure. The patent expressed understanding and agreed to the procedure. Informed written consent was obtained prior to the start of the procedure and can be found in the patient's chart.    PATIENT NAME: Ze Toscano   MRN: [id number]     DATE OF PROCEDURE: 02/25/2025    PROCEDURE: Lumbar Interlaminar Epidural Steroid Injection L5/S1 under Fluoroscopic Guidance    PRE-OP DIAGNOSIS: Lumbar radiculopathy [M54.16] Lumbar radiculopathy [M54.16]    POST-OP DIAGNOSIS: Same    PHYSICIAN: Nacho Sullivan MD    MEDICATIONS INJECTED: Preservative-free Decadron 10mg with 7cc of preservative free normal saline    LOCAL ANESTHETIC INJECTED: Xylocaine 2%       ESTIMATED BLOOD LOSS: None    COMPLICATIONS: None    TECHNIQUE: Time-out was performed to identify the patient and procedure to be performed. With the patient laying in a prone position, the surgical area was prepped and draped in the usual sterile fashion using ChloraPrep and a fenestrated drape. The level was determined under fluoroscopy guidance. Skin anesthesia was achieved by injecting Lidocaine 2% over the injection site. The interlaminar space was then approached with a 20 gauge,  3.5 inch Tuohy needle that was introduced under fluoroscopic guidance in the AP, lateral and/or contralateral oblique imaging. Once the Ligamentum flavum was encountered loss of resistance to saline was used to enter the epidural space. With positive loss of resistance and negative aspiration for CSF or Blood, contrast dye Omnipaque (300mg/mL) was injected to confirm placement and there was no vascular runoff. 8 mL of the medication mixture listed above was injected slowly. Displacement of the radio opaque contrast after injection of the medication confirmed that the medication went into the area  of the epidural space. The needles were removed and bleeding was nil. A sterile dressing was applied. No specimens collected. The patient tolerated the procedure well.     The patient was monitored after the procedure in the recovery area. They were given post-procedure and discharge instructions to follow at home. The patient was discharged in a stable condition.      Nacho Sullivan MD

## 2025-02-25 NOTE — H&P
HPI  Patient presenting for Procedure(s) (LRB):  L5/S1 ELLI (N/A)     No health changes since previous encounter    Past Medical History:   Diagnosis Date    Allergy     Asthma     Right knee pain      Past Surgical History:   Procedure Laterality Date    APPENDECTOMY  2005    EPIDURAL STEROID INJECTION INTO LUMBAR SPINE N/A 11/18/2024    Procedure: L5-S1 ELLI;  Surgeon: Natali Velasco DO;  Location: [facility];  Service: Pain Management;  Laterality: N/A;  no AC    ESOPHAGOGASTRODUODENOSCOPY N/A 08/29/2023    Procedure: EGD (ESOPHAGOGASTRODUODENOSCOPY);  Surgeon: Glen Parada MD;  Location: [facility];  Service: Endoscopy;  Laterality: N/A;    WISDOM TOOTH EXTRACTION       Review of patient's allergies indicates:   Allergen Reactions    House dust     Latex, natural rubber Hives      Current Facility-Administered Medications   Medication    alprazolam ODT dissolvable tablet 0.5 mg       PMHx, PSHx, Allergies, Medications reviewed in epic    ROS negative except pain complaints in HPI    OBJECTIVE:    There were no vitals taken for this visit.    PHYSICAL EXAMINATION:    GENERAL: Well appearing, in no acute distress, alert and oriented x3.  PSYCH:  Mood and affect appropriate.  SKIN: Skin color, texture, turgor normal, no rashes or lesions which will impact the procedure.  CV: RRR with palpation of the radial artery.  PULM: No evidence of respiratory difficulty, symmetric chest rise. Clear to auscultation.  NEURO: Cranial nerves grossly intact.    Plan:    Proceed with procedure as planned Procedure(s) (LRB):  L5/S1 ELLI (N/A)    Nacho Sullivan  02/25/2025

## 2025-02-25 NOTE — PLAN OF CARE
Pt in preop bay 23, VSS taken. Pt does not want any oral or IV sedation. Pt denies any open wounds on body or the use of any weight loss injections. Pt ready to roll.    Procedural consents verified with pt.

## 2025-02-25 NOTE — DISCHARGE SUMMARY
Discharge Note  Short Stay      SUMMARY     Admit Date: 2/25/2025    Attending Physician: Nacho Sullivan      Discharge Physician: Nacho Sullivan      Discharge Date: 2/25/2025 8:00 AM    Procedure(s) (LRB):  L5/S1 ELLI (N/A)    Final Diagnosis: Lumbar radiculopathy [M54.16]    Disposition: Home or self care    Patient Instructions:   Current Discharge Medication List        CONTINUE these medications which have NOT CHANGED    Details   albuterol (PROVENTIL) 2.5 mg /3 mL (0.083 %) nebulizer solution Take 3 mLs (2.5 mg total) by nebulization every 6 (six) hours as needed for Wheezing. Rescue  Qty: 20 mL, Refills: 2    Associated Diagnoses: Mild intermittent asthma without complication      albuterol (PROVENTIL/VENTOLIN HFA) 90 mcg/actuation inhaler INHALE 2 PUFFS BY MOUTH EVERY 6 HOURS AS NEEDED FOR WHEEZING ,  RESCUE  Qty: 7 g, Refills: 0    Associated Diagnoses: Mild intermittent asthma without complication      cyclobenzaprine (FLEXERIL) 10 MG tablet Take 1 tablet (10 mg total) by mouth 3 (three) times daily as needed for Muscle spasms.  Qty: 30 tablet, Refills: 1    Associated Diagnoses: Muscle spasm      baclofen (LIORESAL) 10 MG tablet Take 1 tablet (10 mg total) by mouth 3 (three) times daily.  Qty: 90 tablet, Refills: 0      budesonide-formoterol 80-4.5 mcg (BREYNA) 80-4.5 mcg/actuation HFAA Inhale 2 puffs into the lungs once daily. Controller  Qty: 11 g, Refills: 2    Associated Diagnoses: Mild intermittent asthma without complication      HYDROcodone-acetaminophen (NORCO) 5-325 mg per tablet Take 1 tablet by mouth every 12 (twelve) hours as needed for Pain.  Qty: 20 tablet, Refills: 0    Comments: Quantity prescribed more than 7 day supply? No  Associated Diagnoses: Chronic right-sided low back pain with right-sided sciatica      influenza (FLUARIX TRIV 9669-1350, PF,) 45 mcg (15 mcg x 3)/0.5 mL IM vaccine (> or = 6 mo) Inject into the muscle.  Qty: 0.5 mL, Refills: 0      ketoconazole (NIZORAL) 2 %  shampoo Apply topically twice a week.  Qty: 120 mL, Refills: 2    Associated Diagnoses: Tinea versicolor      meloxicam (MOBIC) 15 MG tablet Take 1 tablet (15 mg total) by mouth once daily.  Qty: 30 tablet, Refills: 0      ondansetron (ZOFRAN) 8 MG tablet Take 1 tablet (8 mg total) by mouth every 8 (eight) hours as needed for Nausea.  Qty: 30 tablet, Refills: 1    Associated Diagnoses: Nausea      ondansetron (ZOFRAN-ODT) 4 MG TbDL Take 1 tablet (4 mg total) by mouth 2 (two) times daily.  Qty: 30 tablet, Refills: 2    Associated Diagnoses: Nausea                 Discharge Diagnosis: Lumbar radiculopathy [M54.16]  Condition on Discharge: Stable with no complications to procedure   Diet on Discharge: Same as before.  Activity: as per instruction sheet.  Discharge to: Home with a responsible adult.  Follow up: 2-4 weeks       Please call my office or pager at [phone] if experienced any weakness or loss of sensation, fever > 101.5, pain uncontrolled with oral medications, persistent nausea/vomiting/or diarrhea, redness or drainage from the incisions, or any other worrisome concerns. If physician on call was not reached or could not communicate with our office for any reason please go to the nearest emergency department

## 2025-03-10 ENCOUNTER — TELEPHONE (OUTPATIENT)
Dept: PAIN MEDICINE | Facility: CLINIC | Age: 35
End: 2025-03-10
Payer: COMMERCIAL

## 2025-03-12 ENCOUNTER — TELEPHONE (OUTPATIENT)
Dept: PAIN MEDICINE | Facility: CLINIC | Age: 35
End: 2025-03-12
Payer: COMMERCIAL

## 2025-04-23 DIAGNOSIS — M62.838 MUSCLE SPASM: ICD-10-CM

## 2025-04-23 RX ORDER — CYCLOBENZAPRINE HCL 10 MG
10 TABLET ORAL 3 TIMES DAILY PRN
Qty: 30 TABLET | Refills: 1 | Status: SHIPPED | OUTPATIENT
Start: 2025-04-23

## 2025-04-23 NOTE — TELEPHONE ENCOUNTER
----- Message from Kylie sent at 4/22/2025 11:19 AM CDT -----  Type:  RX Refill RequestWho Called: pt Refill or New Rx:refill RX Name and Strength:baclofen (LIORESAL) 10 MG tablet, cyclobenzaprine (FLEXERIL) 10 MG tabletPreferred Pharmacy with phone number:[facility] [place] - [address] [address]Ssqef: [phone] Fax: [phone] or Mail Order:local Ordering Provider:Francois Would the patient rather a call back or a response via [facility]? Call Best Call Back Number: [phone] Information:   normal...

## 2025-04-24 ENCOUNTER — TELEPHONE (OUTPATIENT)
Dept: PAIN MEDICINE | Facility: CLINIC | Age: 35
End: 2025-04-24

## 2025-04-24 ENCOUNTER — OFFICE VISIT (OUTPATIENT)
Dept: PAIN MEDICINE | Facility: CLINIC | Age: 35
End: 2025-04-24
Payer: COMMERCIAL

## 2025-04-24 DIAGNOSIS — M54.16 LUMBAR RADICULOPATHY: Primary | ICD-10-CM

## 2025-04-24 DIAGNOSIS — M54.16 LUMBAR RADICULOPATHY, CHRONIC: ICD-10-CM

## 2025-04-24 DIAGNOSIS — M51.362 DEGENERATION OF INTERVERTEBRAL DISC OF LUMBAR REGION WITH DISCOGENIC BACK PAIN AND LOWER EXTREMITY PAIN: ICD-10-CM

## 2025-04-24 NOTE — PROGRESS NOTES
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 subsequently resolved with exercise and PT.  Patient reports acute onset of pain 1 week ago while exercising.  He was an MRI of the lumbar spine ordered, but has not yet been completed.  His pain is gradually improving with starting Medrol Dosepak.  We discussed the underlying diagnoses and multiple treatment options including non-opioid medications, interventional procedures, physical therapy, and home exercise.  Consider lumbar epidural steroid injection pending review of updated imaging.  The risks and benefits of each treatment option were discussed and all questions were answered.      11/07/2024-Ze Toscano is a 34 y.o. male who  has a past medical history of Allergy, Asthma, and Right knee pain.  By history and examination this patient has chronic low back pain with radiculopathy.  The underlying cause cause is degenerative disc disease, foraminal stenosis, and central canal stenosis.  Pathology is confirmed by imaging.  We discussed the underlying diagnoses and multiple treatment options including non-opioid medications, interventional procedures, physical therapy, home exercise, core muscle enhancement, and activity modification.  The risks and benefits of each treatment option were discussed and all questions were answered.      02/14/2025-patient presents today for follow up of his low back pain 2nd to lumbar disc disease with radiculopathy.  Previous epidural steroid injection provided 100% relief of his pain x3 months.  Pain has slowly returned and he would like to repeat the injection.  Orders placed.      04/24/2025-patient presents today following repeat epidural steroid injection on 02/25/2025.  He reports no improvement.  He states his low back pain has actually started to worsen.  I am recommending updated lumbar MRI imaging.  Patient is  requesting an additional epidural.  Referral to PT place as well.    Treatment Plan:   Procedures:  Schedule for repeat L5-S1 IESI   PT/OT/HEP: I have stressed the importance of physical activity and a home exercise plan to help with pain and improve health.  Referral placed to physical therapy.  Medications:    - may continue with over-the-counter ibuprofen and tiger balm if helpful.   -  Reviewed and consistent with medication use as prescribed.  Imaging:  Updated lumbar MRI ordered.  Follow Up: 2-3 weeks postprocedure    Natali Velasco,    Interventional Pain Management    Disclaimer: This note was partly generated using dictation software which may occasionally result in transcription errors.

## 2025-04-25 ENCOUNTER — TELEPHONE (OUTPATIENT)
Dept: PAIN MEDICINE | Facility: CLINIC | Age: 35
End: 2025-04-25
Payer: COMMERCIAL

## 2025-04-25 DIAGNOSIS — M54.16 LUMBAR RADICULOPATHY: Primary | ICD-10-CM

## 2025-04-25 DIAGNOSIS — M51.362 DEGENERATION OF INTERVERTEBRAL DISC OF LUMBAR REGION WITH DISCOGENIC BACK PAIN AND LOWER EXTREMITY PAIN: ICD-10-CM

## 2025-04-25 DIAGNOSIS — M54.16 LUMBAR RADICULOPATHY, CHRONIC: ICD-10-CM

## 2025-04-25 NOTE — TELEPHONE ENCOUNTER
----- Message from Melida Velasco sent at 2025  3:30 PM CDT -----  Regarding: Order for KINGA VALERO    Patient Name: KINGA VALERO([id number])  Sex: Male  : 1990      PCP: JAMESON ADAMES    Center[facility]     Types of orders made on 2025: Imaging, Outpatient Referral, Procedure                                       Request    Order Date:2025  Ordering User:MELIDA VELASCO [[id number]]  Encounter Provider:Melida Velasco DO [[id number]]  Authorizing Provider: Melida Velasco DO [[id number]]  Department:[facility][[id number]]    Common Order Information  Procedure -> Epidural Injection (specify level) Cmt: L5/S1    Pre-op Diagnosis -> Lumbar disc disease with radiculopathy     Order Specific Information  Order: Procedure Request Order for Pain Management [Custom: [id number]]  Order #:          [id number]: 1 FUTURE    Priority: Routine  Class: Clinic Performed    Future Order Information      Expires on:2026            Expected by:2025                   Associated Diagnoses      M54.16 Lumbar radiculopathy      M51.362 Degeneration of intervertebral disc of lumbar region with       discogenic back pain and lower extremity pain      M54.16 Lumbar radiculopathy, chronic      Physician -> Gelter         Is patient on anti-coagulants? -> No         Facility Name: -> [facility]         Follow-up: -> 4 weeks Cmt: [facility]          Priority: Routine  Class: Clinic Performed    Future Order Information      Expires on:2026            Expected by:2025                   Associated Diagnoses      M54.16 Lumbar radiculopathy      M51.362 Degeneration of intervertebral disc of lumbar region with       discogenic back pain and lower extremity pain      M54.16 Lumbar radiculopathy, chronic      Procedure -> Epidural Injection (specify level) Cmt: L5/S1        Physician -> Gelter         Is patient on anti-coagulants? -> No         Pre-op Diagnosis -> Lumbar  disc disease with radiculopathy         Facility Name: -> [facility]         Follow-up: -> 4 weeks Cmt: [facility]

## 2025-05-01 ENCOUNTER — TELEPHONE (OUTPATIENT)
Dept: PAIN MEDICINE | Facility: CLINIC | Age: 35
End: 2025-05-01
Payer: COMMERCIAL

## 2025-05-01 NOTE — TELEPHONE ENCOUNTER
----- Message from Susanna sent at 5/1/2025  3:41 PM CDT -----  Regarding: Appt Access  Contact: [phone]  Type:  Patient Returning CallWho Called:Patient Who Left Message for Patient:Masha Does the patient know what this is regarding?:Yes to reschedule appt Would the patient rather a call back or a response via [facility]? [facility] Call [facility] Number:[phone]

## 2025-05-02 ENCOUNTER — CLINICAL SUPPORT (OUTPATIENT)
Dept: REHABILITATION | Facility: HOSPITAL | Age: 35
End: 2025-05-02
Attending: STUDENT IN AN ORGANIZED HEALTH CARE EDUCATION/TRAINING PROGRAM
Payer: COMMERCIAL

## 2025-05-02 DIAGNOSIS — M51.362 DEGENERATION OF INTERVERTEBRAL DISC OF LUMBAR REGION WITH DISCOGENIC BACK PAIN AND LOWER EXTREMITY PAIN: ICD-10-CM

## 2025-05-02 DIAGNOSIS — M54.16 LUMBAR RADICULOPATHY: ICD-10-CM

## 2025-05-02 PROCEDURE — 97140 MANUAL THERAPY 1/> REGIONS: CPT

## 2025-05-02 PROCEDURE — 97161 PT EVAL LOW COMPLEX 20 MIN: CPT

## 2025-05-06 ENCOUNTER — CLINICAL SUPPORT (OUTPATIENT)
Dept: REHABILITATION | Facility: HOSPITAL | Age: 35
End: 2025-05-06
Payer: COMMERCIAL

## 2025-05-06 DIAGNOSIS — M51.362 DEGENERATION OF INTERVERTEBRAL DISC OF LUMBAR REGION WITH DISCOGENIC BACK PAIN AND LOWER EXTREMITY PAIN: ICD-10-CM

## 2025-05-06 DIAGNOSIS — M54.16 LUMBAR RADICULOPATHY: Primary | ICD-10-CM

## 2025-05-06 PROCEDURE — 97110 THERAPEUTIC EXERCISES: CPT

## 2025-05-06 PROCEDURE — 97140 MANUAL THERAPY 1/> REGIONS: CPT

## 2025-05-13 ENCOUNTER — CLINICAL SUPPORT (OUTPATIENT)
Dept: REHABILITATION | Facility: HOSPITAL | Age: 35
End: 2025-05-13
Attending: STUDENT IN AN ORGANIZED HEALTH CARE EDUCATION/TRAINING PROGRAM
Payer: COMMERCIAL

## 2025-05-13 DIAGNOSIS — M54.16 LUMBAR RADICULOPATHY: Primary | ICD-10-CM

## 2025-05-13 DIAGNOSIS — M51.362 DEGENERATION OF INTERVERTEBRAL DISC OF LUMBAR REGION WITH DISCOGENIC BACK PAIN AND LOWER EXTREMITY PAIN: ICD-10-CM

## 2025-05-13 PROCEDURE — 97140 MANUAL THERAPY 1/> REGIONS: CPT

## 2025-05-13 PROCEDURE — 97110 THERAPEUTIC EXERCISES: CPT

## 2025-05-15 NOTE — PROGRESS NOTES
Outpatient Rehab    Physical Therapy Evaluation    Patient Name: KINGA Toscano  MRN: [id number]  YOB: 1990  Encounter Date: 5/2/2025    Therapy Diagnosis:   Encounter Diagnoses   Name Primary?    Lumbar radiculopathy     Degeneration of intervertebral disc of lumbar region with discogenic back pain and lower extremity pain      Physician: Natali Velasco DO    Physician Orders: Eval and Treat  Medical Diagnosis: Lumbar radiculopathy  Degeneration of intervertebral disc of lumbar region with discogenic back pain and lower extremity pain    Visit # / Visits Authorized:  1 / 1  Insurance Authorization Period: 4/24/2025 to 12/31/2025  Date of Evaluation: 5/2/2025  Plan of Care Certification: 5/2/2025 to 7/2/2025    Time In:     Time Out:    Total Time (in minutes):     Total Billable Time (in minutes):      Intake Outcome Measure for FOTO Survey    Therapist reviewed FOTO scores for KINGA Toscano on 5/2/2025.   FOTO report - see Media section or FOTO account episode details.     Intake Score:  %    Precautions:       Subjective   History of Present Illness  KINGA is a 34 y.o. male who reports to physical therapy with a chief concern of Back Pain.                 History of Present Condition/Illness: Patient presents today virtually for follow up of his low back pain with radicular symptoms.  He is status post repeat epidural on 02/25/2025.  He reports no improvement following this injection.  He reports worsening pain to the point where he is having difficulty ADLs including getting dressed.  Denies any new weakness, but does report increased pain.  Denies any recent falls or trauma.  He is continuing to exercise when possible, but has scaled back significantly.  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  He is hoping to repeat the epidural.       Activities of Daily Living  Social history was obtained from Patient.    General Prior Level of Function Comments: No difficulty  with bending, lifting, and household chores  General Current Level of Function Comments: Mild difficulty with bending, lifting and household chores                Past Medical History/Physical Systems Review:   Kinga Toscano  has a past medical history of Allergy, Asthma, and Right knee pain.    Kinga Toscano  has a past surgical history that includes Appendectomy (2005); Esophagogastroduodenoscopy (N/A, 08/29/2023); [place] tooth extraction; Epidural steroid injection into lumbar spine (N/A, 11/18/2024); and injection, steroid, spine, lumbosacral, epidural, caudal approach (N/A, 2/25/2025).    Kinga has a current medication list which includes the following prescription(s): albuterol, albuterol, baclofen, budesonide-formoterol 80-4.5 mcg, cyclobenzaprine, hydrocodone-acetaminophen, fluarix triv [id number] (pf), ketoconazole, meloxicam, ondansetron, and ondansetron.    Review of patient's allergies indicates:   Allergen Reactions    House dust     Latex, natural rubber Hives        Objective      Lumbar Range of Motion   Active (deg) Passive (deg) Pain   Flexion 100 100     Extension 100 100     Right Lateral Flexion 100 100     Right Rotation 100 100     Left Lateral Flexion 100 100     Left Rotation 100 100       Numbers above represent persent of normal motion              Treatment:  therapeutic exercises to develop strength, endurance, ROM, flexibility, posture, and core stabilization for 10 minutes including:  Standing T-Rotation 2 x 10  Shoulder Ext Row 2 x 10  LTRs 3 x 30'  Hamstring Stretch 3 x 30'  RAIN Stretch 3 x 30'  Bridges 3 x 10  Sciatic Nerve Glides 3 x 10      manual therapy techniques for 10 minutes, including:  FDN to Lumbar Spine using 6 40 mm needles. 3 mhz e-stim was used.  STM to Lumbar Spine  Grd III/IV Joint Mobs to Lumbar Spine  Grd V Joint Mobs to Lumbar Spine     Time Entry(in minutes):       Assessment & Plan   Assessment  KINGA presents with a condition of Low  complexity.   Presentation of Symptoms: Stable       Functional Limitations: Activity tolerance, Pain when reaching, Pain with ADLs/IADLs, Range of motion, Participating in leisure activities, Painful locomotion/ambulation  Impairments: Activity intolerance, Abnormal or restricted range of motion, Impaired physical strength, Pain with functional activity    Prognosis: Excellent  Assessment Details: Patient presents with decreased lumbar ROM, decreased LE strength, decreased functional mobility, and increased pain. Pt was educated on compliance with HEP and role of PT. Patient tolerated therex well and had no complaints of pain with  therapy. Cont to progress POC as tolerated.     Plan  From a physical therapy perspective, the patient would benefit from: Skilled Rehab Services    Planned therapy interventions include: Therapeutic exercise, Therapeutic activities, Neuromuscular re-education, Manual therapy, ADLs/IADLs, and Other (Comment). Dry Needling (prn)  Planned modalities to include: Biofeedback, Electrical stimulation - attended, Electrical stimulation - passive/unattended, Thermotherapy (hot pack), and Cryotherapy (cold pack).                   This plan was discussed with Patient.   Discussion participants: Agreed Upon Plan of Care             Patient's spiritual, cultural, and educational needs considered and patient agreeable to plan of care and goals.           Goals:   Active       Physical Therapy       Physical Therapy Goal (Progressing)       Start:  05/15/25    Expected End:  07/02/25       Short Term Goals (4 Weeks):  1. Pt will be compliant with HEP to supplement PT in decreasing pain with functional mobility.  2. Pt will perform pallof press with good control to demonstrate improved core strength.  3. Pt will improve impaired LE MMTs by 1/2 score to improve strength for functional tasks.  Long Term Goals (8 Weeks):   1. Pt will improve FOTO score to >/= 60 to decrease perceived limitation with  maintaining/changing body position.   2. Pt will perform squat with good form to reduce risk of re injury with lifting.  3. Pt will improve impaired LE MMTs by 1 score to improve strength for functional tasks.  4. Pt will report no pain during lumbar ROM to promote functional mobility.               Naif Campbell, PT

## 2025-05-19 PROBLEM — M54.16 LUMBAR RADICULOPATHY: Status: ACTIVE | Noted: 2025-05-19

## 2025-05-19 PROBLEM — M51.362 DEGENERATION OF INTERVERTEBRAL DISC OF LUMBAR REGION WITH DISCOGENIC BACK PAIN AND LOWER EXTREMITY PAIN: Status: ACTIVE | Noted: 2025-05-19

## 2025-05-19 NOTE — PROGRESS NOTES
Outpatient Rehab    Physical Therapy Visit    Patient Name: KINGA Toscano  MRN: [id number]  YOB: 1990  Encounter Date: 5/6/2025    Therapy Diagnosis: No diagnosis found.  Physician: Natali Velasco DO    Physician Orders: Eval and Treat  Medical Diagnosis: Lumbar radiculopathy  Degeneration of intervertebral disc of lumbar region with discogenic back pain and lower extremity pain    Visit # / Visits Authorized:  2 / 10  Insurance Authorization Period: 5/2/2025 to 12/31/2025  Date of Evaluation: 5/2/2025  Plan of Care Certification: 5/2/2025 to 7/2/2025      PT/PTA: PT   Number of PTA visits since last PT visit:0  Time In: 1000   Time Out: 1100  Total Time (in minutes): 60   Total Billable Time (in minutes):      FOTO:  Intake Score:  %  Survey Score 2:  %  Survey Score 3:  %    Precautions:       Subjective   Still reports soreness and tightness in low back.  Pain reported as 5/10.      Objective            Treatment:   therapeutic exercises to develop strength, endurance, ROM, flexibility, posture, and core stabilization for 30 minutes including:  NuStep 3'/3' (fwd/bckwd)  Standing T-Rotation 2 x 10  Shoulder Ext Row 2 x 10  LTRs 3 x 30'  Hamstring Stretch 3 x 30'  RAIN Stretch 3 x 30'  Bridges 3 x 10  Sciatic Nerve Glides 3 x 10      manual therapy techniques for 30 minutes, including:  FDN to Lumbar Spine using 6 40 mm needles. 3 mhz e-stim was used.  STM to Lumbar Spine  Grd III/IV Joint Mobs to Lumbar Spine  Grd V Joint Mobs to Lumbar Spine     Time Entry(in minutes):       Assessment & Plan   Assessment:    Evaluation/Treatment Tolerance: Patient tolerated treatment well    Patient will continue to benefit from skilled outpatient physical therapy to address the deficits listed in the problem list box on initial evaluation, provide pt/family education and to maximize pt's level of independence in the home and community environment.     Patient's spiritual, cultural, and educational  needs considered and patient agreeable to plan of care and goals.           Plan:      Goals:   Active       Physical Therapy       Physical Therapy Goal (Progressing)       Start:  05/15/25    Expected End:  07/02/25       Short Term Goals (4 Weeks):  1. Pt will be compliant with HEP to supplement PT in decreasing pain with functional mobility.  2. Pt will perform pallof press with good control to demonstrate improved core strength.  3. Pt will improve impaired LE MMTs by 1/2 score to improve strength for functional tasks.  Long Term Goals (8 Weeks):   1. Pt will improve FOTO score to >/= 60 to decrease perceived limitation with maintaining/changing body position.   2. Pt will perform squat with good form to reduce risk of re injury with lifting.  3. Pt will improve impaired LE MMTs by 1 score to improve strength for functional tasks.  4. Pt will report no pain during lumbar ROM to promote functional mobility.               Naif Campbell, PT

## 2025-05-21 ENCOUNTER — TELEPHONE (OUTPATIENT)
Dept: PAIN MEDICINE | Facility: CLINIC | Age: 35
End: 2025-05-21
Payer: COMMERCIAL

## 2025-05-23 ENCOUNTER — CLINICAL SUPPORT (OUTPATIENT)
Dept: REHABILITATION | Facility: HOSPITAL | Age: 35
End: 2025-05-23
Attending: STUDENT IN AN ORGANIZED HEALTH CARE EDUCATION/TRAINING PROGRAM
Payer: COMMERCIAL

## 2025-05-23 DIAGNOSIS — M54.16 LUMBAR RADICULOPATHY: Primary | ICD-10-CM

## 2025-05-23 DIAGNOSIS — M51.362 DEGENERATION OF INTERVERTEBRAL DISC OF LUMBAR REGION WITH DISCOGENIC BACK PAIN AND LOWER EXTREMITY PAIN: ICD-10-CM

## 2025-05-23 PROCEDURE — 97140 MANUAL THERAPY 1/> REGIONS: CPT

## 2025-05-23 PROCEDURE — 97110 THERAPEUTIC EXERCISES: CPT

## 2025-05-27 NOTE — PROGRESS NOTES
Outpatient Rehab    Physical Therapy Visit    Patient Name: KINGA Toscano  MRN: [id number]  YOB: 1990  Encounter Date: 5/13/2025    Therapy Diagnosis: No diagnosis found.  Physician: Natali Velasco DO    Physician Orders: Eval and Treat  Medical Diagnosis: Lumbar radiculopathy  Degeneration of intervertebral disc of lumbar region with discogenic back pain and lower extremity pain    Visit # / Visits Authorized:  3 / 10  Insurance Authorization Period: 5/2/2025 to 12/31/2025  Date of Evaluation: 5/2/2025  Plan of Care Certification: 5/2/2025 to 7/2/2025      PT/PTA:     Number of PTA visits since last PT visit:   Time In:     Time Out:    Total Time (in minutes):     Total Billable Time (in minutes):      FOTO:  Intake Score:  %  Survey Score 2:  %  Survey Score 3:  %    Precautions:       Subjective             Objective            Treatment:   therapeutic exercises to develop strength, endurance, ROM, flexibility, posture, and core stabilization for 30 minutes including:  NuStep 3'/3' (fwd/bckwd)  Standing T-Rotation 2 x 10  Shoulder Ext Row 2 x 10  LTRs 3 x 30'  Hamstring Stretch 3 x 30'  RAIN Stretch 3 x 30'  Bridges 3 x 10  Sciatic Nerve Glides 3 x 10      manual therapy techniques for 30 minutes, including:  FDN to Lumbar Spine using 6 40 mm needles. 3 mhz e-stim was used.  STM to Lumbar Spine  Grd III/IV Joint Mobs to Lumbar Spine  Grd V Joint Mobs to Lumbar Spine     Time Entry(in minutes):       Assessment & Plan   Assessment:         Patient will continue to benefit from skilled outpatient physical therapy to address the deficits listed in the problem list box on initial evaluation, provide pt/family education and to maximize pt's level of independence in the home and community environment.     Patient's spiritual, cultural, and educational needs considered and patient agreeable to plan of care and goals.           Plan:      Goals:   Active       Physical Therapy       Physical  Therapy Goal (Progressing)       Start:  05/15/25    Expected End:  07/02/25       Short Term Goals (4 Weeks):  1. Pt will be compliant with HEP to supplement PT in decreasing pain with functional mobility.  2. Pt will perform pallof press with good control to demonstrate improved core strength.  3. Pt will improve impaired LE MMTs by 1/2 score to improve strength for functional tasks.  Long Term Goals (8 Weeks):   1. Pt will improve FOTO score to >/= 60 to decrease perceived limitation with maintaining/changing body position.   2. Pt will perform squat with good form to reduce risk of re injury with lifting.  3. Pt will improve impaired LE MMTs by 1 score to improve strength for functional tasks.  4. Pt will report no pain during lumbar ROM to promote functional mobility.               Naif Campbell, PT

## 2025-05-28 ENCOUNTER — TELEPHONE (OUTPATIENT)
Dept: PAIN MEDICINE | Facility: CLINIC | Age: 35
End: 2025-05-28
Payer: COMMERCIAL

## 2025-05-30 ENCOUNTER — TELEPHONE (OUTPATIENT)
Dept: PAIN MEDICINE | Facility: HOSPITAL | Age: 35
End: 2025-05-30
Payer: COMMERCIAL

## 2025-06-03 ENCOUNTER — CLINICAL SUPPORT (OUTPATIENT)
Dept: REHABILITATION | Facility: HOSPITAL | Age: 35
End: 2025-06-03
Payer: COMMERCIAL

## 2025-06-03 DIAGNOSIS — M54.16 LUMBAR RADICULOPATHY: Primary | ICD-10-CM

## 2025-06-03 PROCEDURE — 97110 THERAPEUTIC EXERCISES: CPT

## 2025-06-03 PROCEDURE — 97140 MANUAL THERAPY 1/> REGIONS: CPT

## 2025-06-04 ENCOUNTER — HOSPITAL ENCOUNTER (OUTPATIENT)
Facility: HOSPITAL | Age: 35
Discharge: HOME | End: 2025-06-04
Attending: STUDENT IN AN ORGANIZED HEALTH CARE EDUCATION/TRAINING PROGRAM | Admitting: STUDENT IN AN ORGANIZED HEALTH CARE EDUCATION/TRAINING PROGRAM
Payer: COMMERCIAL

## 2025-06-04 VITALS
DIASTOLIC BLOOD PRESSURE: 72 MMHG | HEIGHT: 72 IN | HEART RATE: 74 BPM | RESPIRATION RATE: 16 BRPM | OXYGEN SATURATION: 99 % | BODY MASS INDEX: 30.61 KG/M2 | WEIGHT: 226 LBS | TEMPERATURE: 99 F | SYSTOLIC BLOOD PRESSURE: 143 MMHG

## 2025-06-04 DIAGNOSIS — M51.362 DEGENERATION OF INTERVERTEBRAL DISC OF LUMBAR REGION WITH DISCOGENIC BACK PAIN AND LOWER EXTREMITY PAIN: ICD-10-CM

## 2025-06-04 DIAGNOSIS — M54.16 LUMBAR RADICULOPATHY: Primary | ICD-10-CM

## 2025-06-04 DIAGNOSIS — G89.29 CHRONIC PAIN: ICD-10-CM

## 2025-06-04 PROCEDURE — 25500020 PHARM REV CODE 255: Performed by: STUDENT IN AN ORGANIZED HEALTH CARE EDUCATION/TRAINING PROGRAM

## 2025-06-04 PROCEDURE — 63600175 PHARM REV CODE 636 W HCPCS: Performed by: STUDENT IN AN ORGANIZED HEALTH CARE EDUCATION/TRAINING PROGRAM

## 2025-06-04 PROCEDURE — 62323 NJX INTERLAMINAR LMBR/SAC: CPT | Performed by: STUDENT IN AN ORGANIZED HEALTH CARE EDUCATION/TRAINING PROGRAM

## 2025-06-04 PROCEDURE — 62323 NJX INTERLAMINAR LMBR/SAC: CPT | Mod: ,,, | Performed by: STUDENT IN AN ORGANIZED HEALTH CARE EDUCATION/TRAINING PROGRAM

## 2025-06-04 RX ORDER — DEXAMETHASONE SODIUM PHOSPHATE 10 MG/ML
INJECTION, SOLUTION INTRA-ARTICULAR; INTRALESIONAL; INTRAMUSCULAR; INTRAVENOUS; SOFT TISSUE
Status: DISCONTINUED | OUTPATIENT
Start: 2025-06-04 | End: 2025-06-04 | Stop reason: HOSPADM

## 2025-06-04 RX ORDER — LIDOCAINE HYDROCHLORIDE 20 MG/ML
INJECTION, SOLUTION EPIDURAL; INFILTRATION; INTRACAUDAL; PERINEURAL
Status: DISCONTINUED | OUTPATIENT
Start: 2025-06-04 | End: 2025-06-04 | Stop reason: HOSPADM

## 2025-06-04 NOTE — DISCHARGE SUMMARY
Discharge Note  Short Stay      SUMMARY     Admit Date: 6/4/2025    Attending Physician: Natali Velasco      Discharge Physician: Natali Velasco      Discharge Date: 6/4/2025 2:35 PM    Procedure(s) (LRB):  ELLI L5/S1 (N/A)    Final Diagnosis: Lumbar radiculopathy [M54.16]  Degeneration of intervertebral disc of lumbar region with discogenic back pain and lower extremity pain [M51.362]  Lumbar radiculopathy, chronic [M54.16]    Disposition: Home or self care    Patient Instructions:   Current Discharge Medication List        CONTINUE these medications which have NOT CHANGED    Details   albuterol (PROVENTIL) 2.5 mg /3 mL (0.083 %) nebulizer solution Take 3 mLs (2.5 mg total) by nebulization every 6 (six) hours as needed for Wheezing. Rescue  Qty: 20 mL, Refills: 2    Associated Diagnoses: Mild intermittent asthma without complication      albuterol (PROVENTIL/VENTOLIN HFA) 90 mcg/actuation inhaler INHALE 2 PUFFS BY MOUTH EVERY 6 HOURS AS NEEDED FOR WHEEZING ,  RESCUE  Qty: 7 g, Refills: 0    Associated Diagnoses: Mild intermittent asthma without complication      baclofen (LIORESAL) 10 MG tablet Take 1 tablet (10 mg total) by mouth 3 (three) times daily.  Qty: 90 tablet, Refills: 0      budesonide-formoterol 80-4.5 mcg (BREYNA) 80-4.5 mcg/actuation HFAA Inhale 2 puffs into the lungs once daily. Controller  Qty: 11 g, Refills: 2    Associated Diagnoses: Mild intermittent asthma without complication      cyclobenzaprine (FLEXERIL) 10 MG tablet Take 1 tablet (10 mg total) by mouth 3 (three) times daily as needed for Muscle spasms.  Qty: 30 tablet, Refills: 1    Associated Diagnoses: Muscle spasm      ondansetron (ZOFRAN-ODT) 4 MG TbDL Take 1 tablet (4 mg total) by mouth 2 (two) times daily.  Qty: 30 tablet, Refills: 2    Associated Diagnoses: Nausea      HYDROcodone-acetaminophen (NORCO) 5-325 mg per tablet Take 1 tablet by mouth every 12 (twelve) hours as needed for Pain.  Qty: 20 tablet, Refills: 0    Comments:  Quantity prescribed more than 7 day supply? No  Associated Diagnoses: Chronic right-sided low back pain with right-sided sciatica      influenza (FLUARIX TRIV 8250-6612, PF,) 45 mcg (15 mcg x 3)/0.5 mL IM vaccine (> or = 6 mo) Inject into the muscle.  Qty: 0.5 mL, Refills: 0      ketoconazole (NIZORAL) 2 % shampoo Apply topically twice a week.  Qty: 120 mL, Refills: 2    Associated Diagnoses: Tinea versicolor      meloxicam (MOBIC) 15 MG tablet Take 1 tablet (15 mg total) by mouth once daily.  Qty: 30 tablet, Refills: 0      ondansetron (ZOFRAN) 8 MG tablet Take 1 tablet (8 mg total) by mouth every 8 (eight) hours as needed for Nausea.  Qty: 30 tablet, Refills: 1    Associated Diagnoses: Nausea                 Discharge Diagnosis: Lumbar radiculopathy [M54.16]  Degeneration of intervertebral disc of lumbar region with discogenic back pain and lower extremity pain [M51.362]  Lumbar radiculopathy, chronic [M54.16]  Condition on Discharge: Stable with no complications to procedure   Diet on Discharge: Same as before.  Activity: as per instruction sheet.  Discharge to: Home with a responsible adult.  Follow up: 2-4 weeks       Please call my office or pager at [phone] if experienced any weakness or loss of sensation, fever > 101.5, pain uncontrolled with oral medications, persistent nausea/vomiting/or diarrhea, redness or drainage from the incisions, or any other worrisome concerns. If physician on call was not reached or could not communicate with our office for any reason please go to the nearest emergency department

## 2025-06-04 NOTE — DISCHARGE INSTRUCTIONS
[facility] [place]  Dr. Natali Velasco  Messaging service # [phone]    POST-PROCEDURE INSTRUCTIONS:    Today you had an injection that included a steroid medications.  The steroid may or may not have been mixed with a local anesthetic when it was injected.   If the injection was in the neck, you may feel some pressure, numbness, or slight weakness in the arm after the procedure for a short period of time (this is a normal response), if this persists for longer than 1 day please contact our office or go to the emergency room.  If the injection was in the low back, you may feel some pressure, numbness, or slight weakness in the leg after the procedure for a short period of time (this is a normal response), if this persists for longer than 1 day please contact our office or go to the emergency room.  You may get side effects from the steroid.  This is not uncommon.  Symptoms include: elevated blood sugar, elevated blood pressure, headache, flushing, nausea, insomnia.  These symptoms are transient and will resolve within 1-3 days.  If symptoms last longer than this please contact our office or head to the emergency room.  Steroid medications can take anywhere from 3-14 days to take effect (rarely longer).  You may notice that your pain worsens for a short period of time after the injection, this would not be unusual due to the pressure and trauma from the needle.    If you do not have a follow up appointment scheduled, please contact my office (or the office of the physician who referred you for the procedure) to get a post-procedure follow up scheduled 2-4 weeks after the procedure.  This can be done as a virtual visit if that is more convenient for you.      What you need to do:    Keep a record of your response to the injection you had today.    How much relief did you get?   When did the relief start and how long did it last?  Were you able to decrease the use of any of your pain  medications?  Were you able to increase your level of activity?  How long did the relief last?    What to watch out for:    If you experience any of the following symptoms after your procedure, please notify the messaging service immediately (see above for contact information):   fever (increased oral temperature)   bleeding or swelling at the injection site,    drainage, rash or redness at the injection site    possible signs of infection    increased pain at the injection site   worsening of your usual pain   severe headache   new or worsening numbness    new arm and/or leg weakness, or    changes in bowel and/or bladder function: urinating or defecating on yourself and not knowing that you did it.    PLEASE FOLLOW ALL INSTRUCTIONS CAREFULLY     Do not engage in strenuous activity (e.g., lifting or pushing heavy objects or repeated bending) for 24 hours.     Do not take a bath, swim or use Jacuzzi for 24 hours after procedure. (A shower is fine).   Remove any Band-Aids when you get home.    Use cold/ice, as needed for comfort.  We recommend the use of cold therapy alternating on for 20 minutes, off for 20 minutes.    Do not apply direct heat (heating pad or heat packs) to the injection site for 24 hours.     Resume your usual medications, unless instructed otherwise by your Pain Physician.     If you are on warfarin (Coumadin) or other blood thinner, resume this medication as instructed by your prescribing Physician.    IF AT ANY POINT YOU ARE VERY CONCERNED ABOUT YOUR SYMPTOMS, PLEASE GO TO THE EMERGENCY ROOM.    If you develop worsening pain, weakness, numbness, lose bowel or bladder control (i.e., having an accident where you did not even know you had to go to the bathroom and suddenly noticed you soiled yourself), saddle anesthesia (a loss of sensation restricted to the area of the buttocks, anus and between the legs -- i.e., those parts of your body that would touch a saddle if you were sitting on one) you  need to go immediately to the emergency department for evaluation and treatment.    ----------------------------------------------------------------------------------------------------------------------------------------------------------------  If you received Sedation please read the following instructions:  POST SEDATION INSTRUCTIONS    Today you received intravenous medication (also known as sedation) that was used to help you relax and/or decrease discomfort during your procedure. This medication will be acting in your body for the next 24 hours, so you might feel a little tired or sleepy. This feeling will slowly wear off.   Common side effects associated with these medications include: drowsiness, dizziness, sleepiness, confusion, feeling excited, difficulty remembering things, lack of steadiness with walking or balance, loss of fine muscle control, slowed reflexes, difficulty focusing, and blurred vision.  Some over-the-counter and prescription medications (e.g., muscle relaxants, opioids, mood-altering medications, sedatives/hypnotics, antihistamines) can interact with the intravenous medication you received and cause an increased risk of the side effects listed above in addition to other potentially life threatening side effects. Use extreme caution if you are taking such medications, and consult with your Pain Physician or prescribing physician if you have any questions.  For the next 12-24 hours:    DO NOT--Drive a car, operate machinery or power tools   DO NOT--Drink any alcoholic beverages (not even beer), they may dangerously increase the risk of side effects.    DO NOT--Make any important legal or business decisions or sign important documents.  We advise you to have someone to assist you at home. Move slowly and carefully. Do not make sudden changes in position. Be aware of dizziness or light-headedness and move accordingly.   If you seek medical treatment within 24 hours, let the nurse or doctor  caring for you know that you have received the above medications. If you have any questions or concerns related to your sedation or treatment today please contact us.

## 2025-06-04 NOTE — H&P
HPI  Patient presenting for Procedure(s) (LRB):  ELLI L5/S1 (N/A)     Patient on Anti-coagulation No    No health changes since previous encounter    Past Medical History:   Diagnosis Date    Allergy     Asthma     Right knee pain      Past Surgical History:   Procedure Laterality Date    APPENDECTOMY  2005    EPIDURAL STEROID INJECTION INTO LUMBAR SPINE N/A 11/18/2024    Procedure: L5-S1 ELLI;  Surgeon: Natali Velasco DO;  Location: [facility];  Service: Pain Management;  Laterality: N/A;  no AC    ESOPHAGOGASTRODUODENOSCOPY N/A 08/29/2023    Procedure: EGD (ESOPHAGOGASTRODUODENOSCOPY);  Surgeon: Glen Parada MD;  Location: [facility];  Service: Endoscopy;  Laterality: N/A;    INJECTION, STEROID, SPINE, LUMBOSACRAL, EPIDURAL, CAUDAL APPROACH N/A 2/25/2025    Procedure: L5/S1 ELLI;  Surgeon: Nacho Sullivan MD;  Location: [facility];  Service: Pain Management;  Laterality: N/A;  no sed    WISDOM TOOTH EXTRACTION       Review of patient's allergies indicates:   Allergen Reactions    House dust     Latex, natural rubber Hives      No current facility-administered medications for this encounter.       PMHx, PSHx, Allergies, Medications reviewed in epic    ROS negative except pain complaints in HPI    OBJECTIVE:    There were no vitals taken for this visit.    PHYSICAL EXAMINATION:    GENERAL: Well appearing, in no acute distress, alert and oriented x3.  PSYCH:  Mood and affect appropriate.  SKIN: Skin color, texture, turgor normal, no rashes or lesions which will impact the procedure.  CV: RRR with palpation of the radial artery.  PULM: No evidence of respiratory difficulty, symmetric chest rise. Clear to auscultation.  NEURO: Cranial nerves grossly intact.    Plan:    Proceed with procedure as planned Procedure(s) (LRB):  ELLI L5/S1 (N/A)    Natali Velasco  06/04/2025

## 2025-06-04 NOTE — OP NOTE
Lumbar Interlaminar Epidural Steroid Injection under Fluoroscopic Guidance    The procedure, risks, benefits, and options were discussed with the patient. There are no contraindications to the procedure. The patent expressed understanding and agreed to the procedure. Informed written consent was obtained prior to the start of the procedure and can be found in the patient's chart.    PATIENT NAME: Ze Toscano   MRN: [id number]     DATE OF PROCEDURE: 06/04/2025    PROCEDURE: Lumbar Interlaminar Epidural Steroid Injection L5/S1 under Fluoroscopic Guidance    PRE-OP DIAGNOSIS: Lumbar radiculopathy [M54.16]  Degeneration of intervertebral disc of lumbar region with discogenic back pain and lower extremity pain [M51.362]  Lumbar radiculopathy, chronic [M54.16] Lumbar radiculopathy [M54.16]      POST-OP DIAGNOSIS: Same    PHYSICIAN: Natali Velasco DO    ASSISTANTS: None     MEDICATIONS INJECTED: Preservative-free Decadron 10mg with 4 cc of preservative free normal saline    LOCAL ANESTHETIC INJECTED: Xylocaine 2%     SEDATION: None    ESTIMATED BLOOD LOSS: None    COMPLICATIONS: None    TECHNIQUE: Time-out was performed to identify the patient and procedure to be performed. With the patient laying in a prone position, the surgical area was prepped and draped in the usual sterile fashion using ChloraPrep and a fenestrated drape. The level was determined under fluoroscopy guidance. Skin anesthesia was achieved by injecting Lidocaine 2% over the injection site. The interlaminar space was then approached with a 18 gauge,  3.5 inch Tuohy needle that was introduced under fluoroscopic guidance in the AP, lateral and/or contralateral oblique imaging. Once the Ligamentum flavum was encountered loss of resistance to saline was used to enter the epidural space. With positive loss of resistance and negative aspiration for CSF or Blood, contrast dye Omnipaque (300mg/mL) was injected to confirm placement and there was no vascular  runoff. 5 mL of the medication mixture listed above was injected slowly. Displacement of the radio opaque contrast after injection of the medication confirmed that the medication went into the area of the epidural space. The needles were removed and bleeding was nil. A sterile dressing was applied. No specimens collected. The patient tolerated the procedure well.       The patient was monitored after the procedure in the recovery area. They were given post-procedure and discharge instructions to follow at home. The patient was discharged in a stable condition.    Natali Velasco DO

## 2025-06-12 DIAGNOSIS — J45.20 MILD INTERMITTENT ASTHMA WITHOUT COMPLICATION: ICD-10-CM

## 2025-06-12 RX ORDER — ALBUTEROL SULFATE 90 UG/1
2 INHALANT RESPIRATORY (INHALATION) EVERY 6 HOURS PRN
Qty: 7 G | Refills: 0 | Status: SHIPPED | OUTPATIENT
Start: 2025-06-12

## 2025-06-26 NOTE — PROGRESS NOTES
Outpatient Rehab    Physical Therapy Visit    Patient Name: KINGA Toscano  MRN: [id number]  YOB: 1990  Encounter Date: 5/23/2025    Therapy Diagnosis: No diagnosis found.  Physician: Natali Velasco DO    Physician Orders: Eval and Treat  Medical Diagnosis: Lumbar radiculopathy  Degeneration of intervertebral disc of lumbar region with discogenic back pain and lower extremity pain    Visit # / Visits Authorized:  4 / 10  Insurance Authorization Period: 5/2/2025 to 12/31/2025  Date of Evaluation: 5/2/2025  Plan of Care Certification: 5/2/2025 to 7/2/2025      PT/PTA:     Number of PTA visits since last PT visit:   Time In:     Time Out:    Total Time (in minutes):     Total Billable Time (in minutes):      FOTO:  Intake Score:  %  Survey Score 2:  %  Survey Score 3:  %    Precautions:       Subjective             Objective            Treatment:   therapeutic exercises to develop strength, endurance, ROM, flexibility, posture, and core stabilization for 30 minutes including:  NuStep 3'/3' (fwd/bckwd)  Standing T-Rotation 2 x 10  Shoulder Ext Row 2 x 10  LTRs 3 x 30'  Hamstring Stretch 3 x 30'  RAIN Stretch 3 x 30'  Bridges 3 x 10  Sciatic Nerve Glides 3 x 10      manual therapy techniques for 30 minutes, including:  FDN to Lumbar Spine using 6 40 mm needles. 3 mhz e-stim was used.  STM to Lumbar Spine  Grd III/IV Joint Mobs to Lumbar Spine  Grd V Joint Mobs to Lumbar Spine     Time Entry(in minutes):       Assessment & Plan   Assessment:         Patient will continue to benefit from skilled outpatient physical therapy to address the deficits listed in the problem list box on initial evaluation, provide pt/family education and to maximize pt's level of independence in the home and community environment.     Patient's spiritual, cultural, and educational needs considered and patient agreeable to plan of care and goals.           Plan:      Goals:   Active       Physical Therapy       Physical  Therapy Goal (Progressing)       Start:  05/15/25    Expected End:  07/02/25       Short Term Goals (4 Weeks):  1. Pt will be compliant with HEP to supplement PT in decreasing pain with functional mobility.  2. Pt will perform pallof press with good control to demonstrate improved core strength.  3. Pt will improve impaired LE MMTs by 1/2 score to improve strength for functional tasks.  Long Term Goals (8 Weeks):   1. Pt will improve FOTO score to >/= 60 to decrease perceived limitation with maintaining/changing body position.   2. Pt will perform squat with good form to reduce risk of re injury with lifting.  3. Pt will improve impaired LE MMTs by 1 score to improve strength for functional tasks.  4. Pt will report no pain during lumbar ROM to promote functional mobility.               Naif Campbell, PT

## 2025-06-27 ENCOUNTER — TELEPHONE (OUTPATIENT)
Dept: PAIN MEDICINE | Facility: CLINIC | Age: 35
End: 2025-06-27
Payer: COMMERCIAL

## 2025-06-27 DIAGNOSIS — M54.16 LUMBAR RADICULOPATHY: Primary | ICD-10-CM

## 2025-06-27 DIAGNOSIS — M51.362 DEGENERATION OF INTERVERTEBRAL DISC OF LUMBAR REGION WITH DISCOGENIC BACK PAIN AND LOWER EXTREMITY PAIN: ICD-10-CM

## 2025-06-27 RX ORDER — BACLOFEN 10 MG/1
10 TABLET ORAL 3 TIMES DAILY
Qty: 90 TABLET | Refills: 0 | Status: SHIPPED | OUTPATIENT
Start: 2025-06-27 | End: 2026-06-27

## 2025-06-27 NOTE — PROGRESS NOTES
Outpatient Rehab    Physical Therapy Visit    Patient Name: KINGA Toscano  MRN: [id number]  YOB: 1990  Encounter Date: 6/3/2025    Therapy Diagnosis: No diagnosis found.  Physician: Natali Velasco DO    Physician Orders: Eval and Treat  Medical Diagnosis: Lumbar radiculopathy  Degeneration of intervertebral disc of lumbar region with discogenic back pain and lower extremity pain    Visit # / Visits Authorized:  4 / 10  Insurance Authorization Period: 5/2/2025 to 12/31/2025  Date of Evaluation: 5/2/2025  Plan of Care Certification: 5/2/2025 to 7/2/2025      PT/PTA:     Number of PTA visits since last PT visit:   Time In:     Time Out:    Total Time (in minutes):     Total Billable Time (in minutes):      FOTO:  Intake Score:  %  Survey Score 2:  %  Survey Score 3:  %    Precautions:       Subjective             Objective            Treatment:   therapeutic exercises to develop strength, endurance, ROM, flexibility, posture, and core stabilization for 30 minutes including:  NuStep 3'/3' (fwd/bckwd)  Standing T-Rotation 2 x 10  Shoulder Ext Row 2 x 10  LTRs 3 x 30'  Hamstring Stretch 3 x 30'  RAIN Stretch 3 x 30'  Bridges 3 x 10  Sciatic Nerve Glides 3 x 10      manual therapy techniques for 30 minutes, including:  FDN to Lumbar Spine using 6 40 mm needles. 3 mhz e-stim was used.  STM to Lumbar Spine  Grd III/IV Joint Mobs to Lumbar Spine  Grd V Joint Mobs to Lumbar Spine     Time Entry(in minutes):       Assessment & Plan   Assessment:         Patient will continue to benefit from skilled outpatient physical therapy to address the deficits listed in the problem list box on initial evaluation, provide pt/family education and to maximize pt's level of independence in the home and community environment.     Patient's spiritual, cultural, and educational needs considered and patient agreeable to plan of care and goals.           Plan:      Goals:   Active       Physical Therapy       Physical  Therapy Goal (Progressing)       Start:  05/15/25    Expected End:  07/02/25       Short Term Goals (4 Weeks):  1. Pt will be compliant with HEP to supplement PT in decreasing pain with functional mobility.  2. Pt will perform pallof press with good control to demonstrate improved core strength.  3. Pt will improve impaired LE MMTs by 1/2 score to improve strength for functional tasks.  Long Term Goals (8 Weeks):   1. Pt will improve FOTO score to >/= 60 to decrease perceived limitation with maintaining/changing body position.   2. Pt will perform squat with good form to reduce risk of re injury with lifting.  3. Pt will improve impaired LE MMTs by 1 score to improve strength for functional tasks.  4. Pt will report no pain during lumbar ROM to promote functional mobility.               Naif Campbell, PT

## 2025-07-15 ENCOUNTER — CLINICAL SUPPORT (OUTPATIENT)
Dept: REHABILITATION | Facility: HOSPITAL | Age: 35
End: 2025-07-15
Attending: STUDENT IN AN ORGANIZED HEALTH CARE EDUCATION/TRAINING PROGRAM
Payer: COMMERCIAL

## 2025-07-15 DIAGNOSIS — M54.16 LUMBAR RADICULOPATHY: ICD-10-CM

## 2025-07-15 DIAGNOSIS — M51.362 DEGENERATION OF INTERVERTEBRAL DISC OF LUMBAR REGION WITH DISCOGENIC BACK PAIN AND LOWER EXTREMITY PAIN: ICD-10-CM

## 2025-07-15 PROCEDURE — 97110 THERAPEUTIC EXERCISES: CPT

## 2025-07-15 PROCEDURE — 97140 MANUAL THERAPY 1/> REGIONS: CPT

## 2025-07-15 NOTE — PROGRESS NOTES
Outpatient Rehab    Physical Therapy Visit    Patient Name: KINGA Toscano  MRN: [id number]  YOB: 1990  Encounter Date: 7/15/2025    Therapy Diagnosis:   Encounter Diagnoses   Name Primary?    Lumbar radiculopathy     Degeneration of intervertebral disc of lumbar region with discogenic back pain and lower extremity pain      Physician: Natali Velasco DO    Physician Orders: Eval and Treat  Medical Diagnosis: Lumbar radiculopathy  Degeneration of intervertebral disc of lumbar region with discogenic back pain and lower extremity pain    Visit # / Visits Authorized:  1 / 1  Insurance Authorization Period: 6/27/2025 to 12/31/2025  Date of Evaluation:   Plan of Care Certification:       PT/PTA: PT   Number of PTA visits since last PT visit:0  Time In: 0900   Time Out: 1000  Total Time (in minutes): 60   Total Billable Time (in minutes):      FOTO:  Intake Score:  %  Survey Score 2:  %  Survey Score 3:  %    Precautions:       Subjective   Soreness has returned after gym activity.  Pain reported as 0/10.      Objective            Treatment:   therapeutic exercises to develop strength, endurance, ROM, flexibility, posture, and core stabilization for 15 minutes including:  NuStep 3'/3' (fwd/bckwd)  Standing T-Rotation 2 x 10  Shoulder Ext Row 2 x 10  LTRs 3 x 30'  Hamstring Stretch 3 x 30'  RAIN Stretch 3 x 30'  Bridges 3 x 10  Sciatic Nerve Glides 3 x 10      manual therapy techniques for 45 minutes, including:  FDN to Lumbar Spine using 6 40 mm needles. 3 mhz e-stim was used.  STM to Lumbar Spine  Grd III/IV Joint Mobs to Lumbar Spine  Grd V Joint Mobs to Lumbar Spine     Time Entry(in minutes):       Assessment & Plan   Assessment:         Patient will continue to benefit from skilled outpatient physical therapy to address the deficits listed in the problem list box on initial evaluation, provide pt/family education and to maximize pt's level of independence in the home and community environment.      Patient's spiritual, cultural, and educational needs considered and patient agreeable to plan of care and goals.           Plan:      Goals:         Naif Campbell PT

## 2025-07-22 ENCOUNTER — CLINICAL SUPPORT (OUTPATIENT)
Dept: REHABILITATION | Facility: HOSPITAL | Age: 35
End: 2025-07-22
Attending: STUDENT IN AN ORGANIZED HEALTH CARE EDUCATION/TRAINING PROGRAM
Payer: COMMERCIAL

## 2025-07-22 DIAGNOSIS — M62.81 QUADRICEPS WEAKNESS: ICD-10-CM

## 2025-07-22 DIAGNOSIS — M54.16 LUMBAR RADICULOPATHY: Primary | ICD-10-CM

## 2025-07-22 PROCEDURE — 97140 MANUAL THERAPY 1/> REGIONS: CPT

## 2025-07-22 PROCEDURE — 97110 THERAPEUTIC EXERCISES: CPT

## 2025-07-23 NOTE — PROGRESS NOTES
Outpatient Rehab    Physical Therapy Visit    Patient Name: KINGA Toscano  MRN: [id number]  YOB: 1990  Encounter Date: 7/22/2025    Therapy Diagnosis: No diagnosis found.  Physician: Natali Velasco DO    Physician Orders: Eval and Treat  Medical Diagnosis: Lumbar radiculopathy  Degeneration of intervertebral disc of lumbar region with discogenic back pain and lower extremity pain    Visit # / Visits Authorized:  5 / 10  Insurance Authorization Period: 5/2/2025 to 12/31/2025  Date of Evaluation: 5/2/2025  Plan of Care Certification: 5/2/2025 to 7/2/2025      PT/PTA: PT   Number of PTA visits since last PT visit:0  Time In: 0900   Time Out: 1000  Total Time (in minutes): 60   Total Billable Time (in minutes):      FOTO:  Intake Score:  %  Survey Score 2:  %  Survey Score 3:  %    Precautions:       Subjective   Soreness has returned after gym activity. States that it's mostly on R side of his back and hip.  Pain reported as 7/10.      Objective            Treatment:   therapeutic exercises to develop strength, endurance, ROM, flexibility, posture, and core stabilization for 30 minutes including:  NuStep 3'/3' (fwd/bckwd)  Standing T-Rotation 2 x 10  Shoulder Ext Row 2 x 10  LTRs 3 x 30'  Hamstring Stretch 3 x 30'  RAIN Stretch 3 x 30'  Bridges 3 x 10  Sciatic Nerve Glides 3 x 10      manual therapy techniques for 30 minutes, including:  FDN to Lumbar Spine using 6 40 mm needles. 3 mhz e-stim was used.  STM to Lumbar Spine  Grd III/IV Joint Mobs to Lumbar Spine  Grd V Joint Mobs to Lumbar Spine     Time Entry(in minutes):       Assessment & Plan   Assessment:         Patient will continue to benefit from skilled outpatient physical therapy to address the deficits listed in the problem list box on initial evaluation, provide pt/family education and to maximize pt's level of independence in the home and community environment.     Patient's spiritual, cultural, and educational needs considered  and patient agreeable to plan of care and goals.           Plan:      Goals:   Active       Physical Therapy       Physical Therapy Goal (Progressing)       Start:  05/15/25    Expected End:  07/02/25       Short Term Goals (4 Weeks):  1. Pt will be compliant with HEP to supplement PT in decreasing pain with functional mobility.  2. Pt will perform pallof press with good control to demonstrate improved core strength.  3. Pt will improve impaired LE MMTs by 1/2 score to improve strength for functional tasks.  Long Term Goals (8 Weeks):   1. Pt will improve FOTO score to >/= 60 to decrease perceived limitation with maintaining/changing body position.   2. Pt will perform squat with good form to reduce risk of re injury with lifting.  3. Pt will improve impaired LE MMTs by 1 score to improve strength for functional tasks.  4. Pt will report no pain during lumbar ROM to promote functional mobility.               Naif Campbell, PT

## 2025-07-29 ENCOUNTER — CLINICAL SUPPORT (OUTPATIENT)
Dept: REHABILITATION | Facility: HOSPITAL | Age: 35
End: 2025-07-29
Attending: STUDENT IN AN ORGANIZED HEALTH CARE EDUCATION/TRAINING PROGRAM
Payer: COMMERCIAL

## 2025-07-29 DIAGNOSIS — M51.362 DEGENERATION OF INTERVERTEBRAL DISC OF LUMBAR REGION WITH DISCOGENIC BACK PAIN AND LOWER EXTREMITY PAIN: ICD-10-CM

## 2025-07-29 DIAGNOSIS — M54.16 LUMBAR RADICULOPATHY: Primary | ICD-10-CM

## 2025-08-01 ENCOUNTER — CLINICAL SUPPORT (OUTPATIENT)
Dept: REHABILITATION | Facility: HOSPITAL | Age: 35
End: 2025-08-01
Attending: STUDENT IN AN ORGANIZED HEALTH CARE EDUCATION/TRAINING PROGRAM
Payer: COMMERCIAL

## 2025-08-01 DIAGNOSIS — M51.362 DEGENERATION OF INTERVERTEBRAL DISC OF LUMBAR REGION WITH DISCOGENIC BACK PAIN AND LOWER EXTREMITY PAIN: Primary | ICD-10-CM

## 2025-08-01 PROCEDURE — 97110 THERAPEUTIC EXERCISES: CPT

## 2025-08-01 PROCEDURE — 97140 MANUAL THERAPY 1/> REGIONS: CPT
